# Patient Record
Sex: FEMALE | Race: WHITE | NOT HISPANIC OR LATINO | Employment: OTHER | URBAN - METROPOLITAN AREA
[De-identification: names, ages, dates, MRNs, and addresses within clinical notes are randomized per-mention and may not be internally consistent; named-entity substitution may affect disease eponyms.]

---

## 2017-10-24 ENCOUNTER — TRANSCRIBE ORDERS (OUTPATIENT)
Dept: ADMINISTRATIVE | Facility: HOSPITAL | Age: 72
End: 2017-10-24

## 2017-10-24 DIAGNOSIS — Z12.31 VISIT FOR SCREENING MAMMOGRAM: Primary | ICD-10-CM

## 2017-10-30 ENCOUNTER — ALLSCRIPTS OFFICE VISIT (OUTPATIENT)
Dept: OTHER | Facility: OTHER | Age: 72
End: 2017-10-30

## 2017-10-30 DIAGNOSIS — Z12.31 ENCOUNTER FOR SCREENING MAMMOGRAM FOR MALIGNANT NEOPLASM OF BREAST: ICD-10-CM

## 2017-10-30 DIAGNOSIS — N30.20 OTHER CHRONIC CYSTITIS WITHOUT HEMATURIA: ICD-10-CM

## 2017-10-30 LAB
BILIRUB UR QL STRIP: NORMAL
CLARITY UR: NORMAL
COLOR UR: YELLOW
GLUCOSE (HISTORICAL): NORMAL
HGB UR QL STRIP.AUTO: NORMAL
KETONES UR STRIP-MCNC: NORMAL MG/DL
LEUKOCYTE ESTERASE UR QL STRIP: NORMAL
NITRITE UR QL STRIP: NORMAL
PH UR STRIP.AUTO: 6.5 [PH]
PROT UR STRIP-MCNC: NORMAL MG/DL
SP GR UR STRIP.AUTO: 1.01
UROBILINOGEN UR QL STRIP.AUTO: 0.2

## 2017-11-21 ENCOUNTER — ALLSCRIPTS OFFICE VISIT (OUTPATIENT)
Dept: OTHER | Facility: OTHER | Age: 72
End: 2017-11-21

## 2017-12-15 ENCOUNTER — HOSPITAL ENCOUNTER (OUTPATIENT)
Dept: MAMMOGRAPHY | Facility: MEDICAL CENTER | Age: 72
Discharge: HOME | End: 2017-12-15
Payer: MEDICARE

## 2017-12-15 DIAGNOSIS — Z12.31 ENCOUNTER FOR SCREENING MAMMOGRAM FOR MALIGNANT NEOPLASM OF BREAST: ICD-10-CM

## 2017-12-15 PROCEDURE — G0202 SCR MAMMO BI INCL CAD: HCPCS

## 2018-01-16 NOTE — CONSULTS
Chief Complaint  The patient presents to the office for her routine exam       History of Present Illness  HPI: here for yearly preventive exam; no health issues at [facility]   GYN , Adult Female [place]: The patient is being seen for a health maintenance evaluation  The last health maintenance visit was 13 month(s) ago  General Health: The patient's health since the last visit is described as good  She has regular dental visits  She denies vision problems  She denies hearing loss  Immunizations status: up to date  Lifestyle:  She consumes a diverse and healthy diet  She does not have any weight concerns  She exercises regularly  She does not use tobacco  She denies alcohol use  She denies drug use  Reproductive health:  she reports no menstrual problems  Screening: cancer screening reviewed and current  metabolic screening reviewed and current  risk screening reviewed and current  Review of Systems    Constitutional: No fever, no chills, feels well, no tiredness, no recent weight gain or loss  ENT: no ear ache, no loss of hearing, no nosebleeds or nasal discharge, no sore throat or hoarseness  Cardiovascular: no complaints of slow or fast heart rate, no chest pain, no palpitations, no leg claudication or lower extremity edema  Respiratory: no complaints of shortness of breath, no wheezing, no dyspnea on exertion, no orthopnea or PND  Breasts: no complaints of breast pain, breast lump or nipple discharge  Gastrointestinal: no complaints of abdominal pain, no constipation, no nausea or diarrhea, no vomiting, no bloody stools  Genitourinary: no complaints of dysuria, no incontinence, no pelvic pain, no dysmenorrhea, no vaginal discharge or abnormal vaginal bleeding  Musculoskeletal: no complaints of arthralgia, no myalgia, no joint swelling or stiffness, no limb pain or swelling  Integumentary: no complaints of skin rash or lesion, no itching or dry skin, no skin wounds  Neurological: no complaints of headache, no confusion, no numbness or tingling, no dizziness or fainting  Active Problems    1  Chronic cystitis (595 2) (N30 20)   2  Encounter for routine gynecological examination (V72 31) (Z01 419)   3  Encounter for screening mammogram for malignant neoplasm of breast (V76 12)   (Z12 31)   4  Mammogram abnormal (793 80) (R92 8)   5  Postmenopausal (V49 81) (Z78 0)    Past Medical History    · History of hypertension (V12 59) (Z86 79)   · History of Urinary Tract Infection (V13 02)    Surgical History    · History of Colonoscopy (Fiberoptic) Screening   · History of Hysterectomy   · History of Knee Arthroscopy (Therapeutic)   · History of Salpingo-oophorectomy Right Side    Family History    · Family history of diabetes mellitus (V18 0) (Z83 3)    · Family history of diabetes mellitus (V18 0) (Z83 3)    Social History    · Being A Social Drinker   · Never A Smoker    Current Meds   1  AmLODIPine Besylate 5 MG Oral Tablet; Therapy: (Recorded:65Olj9252) to Recorded   2  Aspirin 81 MG TABS; Take 1 tablet daily Recorded   3  Clarinex TABS Recorded   4  Lexapro 10 MG Oral Tablet; Therapy: (Recorded:20Ctv8839) to Recorded   5  Multivitamins Oral Capsule Recorded   6  Oscal 500/200 D-3 TABS; Therapy: (0370 [id number]) to Recorded   7  Trimethoprim 100 MG Oral Tablet; take 1 tablet every twelve hours; Therapy: [id number] to (Christie Peoples)  Requested for: 64DTM6871; Last   Rx:30Oct2017 Ordered   8  Zocor 20 MG Oral Tablet Recorded    Allergies    1  Benzocaine Oral Anesthetic SOLN   2  Cipro TABS   3  Codeine Sulfate TABS   4  Ibuprofen TABS   5  Latex Gloves MISC   6  Macrobid CAPS   7  Sulfa Drugs    Vitals   Recorded: 61IUV9981 06:25GT   Systolic 068   Diastolic 60   Height 5 ft 5 in   Weight 164 lb 9 oz   BMI Calculated 27 38   BSA Calculated 1 82     Physical Exam    Constitutional   General appearance: No acute distress, well appearing and well nourished  Neck   Neck: Normal, supple, trachea midline, no masses  Thyroid: Normal, no thyromegaly  Pulmonary   Respiratory effort: No increased work of breathing or signs of respiratory distress  Auscultation of lungs: Clear to auscultation  Cardiovascular   Auscultation of heart: Normal rate and rhythm, normal S1 and S2, no murmurs  Peripheral vascular exam: Normal pulses Throughout  Genitourinary   External genitalia: Normal and no lesions appreciated  Vagina: Normal, no lesions or dryness appreciated  Urethra: Normal     Urethral meatus: Normal     Bladder: Normal, soft, non-tender and no prolapse or masses appreciated  Cervix: Surgically absent  Uterus: Surgically absent  Adnexa/parametria: Surgically absent  Anus, perineum, and rectum: Normal sphincter tone, no masses, and no prolapse  Chest   Breasts: Normal and no dimpling or skin changes noted  Abdomen   Abdomen: Normal, non-tender, and no organomegaly noted  Liver and spleen: No hepatomegaly or splenomegaly  Examination for hernias: No hernias appreciated  Stool sample for occult blood: Negative  Lymphatic   Palpation of lymph nodes in neck, axillae, groin and/or other locations: No lymphadenopathy or masses noted  Skin   Skin and subcutaneous tissue: Normal skin turgor and no rashes  Palpation of skin and subcutaneous tissue: Normal     Psychiatric   Orientation to person, place, and time: Normal     Mood and affect: Normal        Assessment    1  Encounter for routine gynecological examination (V72 31) (Z01 419)   2  Postmenopausal (V49 81) (Z78 0)    Plan  Encounter for screening mammogram for malignant neoplasm of breast    · * MAMMO SCREENING BILATERAL W CAD; Status:Hold For - Scheduling,Retrospective  By Protocol Authorization; Requested KMU:[id number];    Perform:[facility]; NDU:09DXO1187;  Last Updated By:Randy Urias; 11/21/2017 11:14:17 AM;Ordered;  For:Encounter for screening mammogram for malignant neoplasm of breast; Ordered By:Nicko Garcia;  Postmenopausal    · Follow-up visit in 1 year Evaluation and Treatment  Follow-up  Status: Hold For -  Scheduling  Requested for: [id number]   Ordered; For: Postmenopausal; Ordered By: Ulices Molina Performed:  Due: 74HNH0644    Discussion/Summary    Reviewed potential gyn emergencies; Rx given for screening mammogram; seen with p marek  student, Nicolás Kirkland        Future Appointments    Signatures   Electronically signed by : Scott Beltran DO; Nov 21 2017 11:55AM EST                       (Author)

## 2018-01-22 VITALS
WEIGHT: 164.56 LBS | HEIGHT: 65 IN | BODY MASS INDEX: 27.42 KG/M2 | DIASTOLIC BLOOD PRESSURE: 60 MMHG | SYSTOLIC BLOOD PRESSURE: 120 MMHG

## 2018-01-22 VITALS
BODY MASS INDEX: 26.82 KG/M2 | DIASTOLIC BLOOD PRESSURE: 72 MMHG | SYSTOLIC BLOOD PRESSURE: 118 MMHG | HEIGHT: 65 IN | WEIGHT: 161 LBS

## 2018-05-24 ENCOUNTER — TRANSCRIBE ORDERS (OUTPATIENT)
Dept: ADMINISTRATIVE | Facility: HOSPITAL | Age: 73
End: 2018-05-24

## 2018-05-24 DIAGNOSIS — Z12.31 VISIT FOR SCREENING MAMMOGRAM: Primary | ICD-10-CM

## 2018-11-01 RX ORDER — OYSTER SHELL CALCIUM WITH VITAMIN D 500; 200 MG/1; [IU]/1
TABLET, FILM COATED ORAL
COMMUNITY

## 2018-11-01 RX ORDER — FLUTICASONE PROPIONATE 50 MCG
2 SPRAY, SUSPENSION (ML) NASAL DAILY
Refills: 0 | COMMUNITY
Start: 2018-08-01

## 2018-11-01 RX ORDER — ESCITALOPRAM OXALATE 10 MG/1
TABLET ORAL
Refills: 0 | COMMUNITY
Start: 2018-08-08

## 2018-11-01 RX ORDER — SIMVASTATIN 20 MG
20 TABLET ORAL EVERY EVENING
Refills: 0 | COMMUNITY
Start: 2018-08-30

## 2018-11-01 RX ORDER — AMLODIPINE BESYLATE 2.5 MG/1
TABLET ORAL
Refills: 0 | COMMUNITY
Start: 2018-09-10

## 2018-11-01 RX ORDER — DESLORATADINE 5 MG/1
TABLET ORAL
Refills: 0 | COMMUNITY
Start: 2018-08-20

## 2018-11-02 ENCOUNTER — OFFICE VISIT (OUTPATIENT)
Dept: UROLOGY | Facility: MEDICAL CENTER | Age: 73
End: 2018-11-02
Payer: MEDICARE

## 2018-11-02 VITALS
SYSTOLIC BLOOD PRESSURE: 120 MMHG | BODY MASS INDEX: 26.2 KG/M2 | DIASTOLIC BLOOD PRESSURE: 70 MMHG | WEIGHT: 163 LBS | HEIGHT: 66 IN

## 2018-11-02 DIAGNOSIS — R31.29 MICROSCOPIC HEMATURIA: ICD-10-CM

## 2018-11-02 DIAGNOSIS — N30.20 CHRONIC CYSTITIS: ICD-10-CM

## 2018-11-02 DIAGNOSIS — N39.0 CHRONIC UTI: Primary | ICD-10-CM

## 2018-11-02 PROCEDURE — 99213 OFFICE O/P EST LOW 20 MIN: CPT | Performed by: UROLOGY

## 2018-11-02 RX ORDER — TRIMETHOPRIM 100 MG/1
100 TABLET ORAL 2 TIMES DAILY
Qty: 20 TABLET | Refills: 0 | Status: SHIPPED | OUTPATIENT
Start: 2018-11-02 | End: 2018-11-12

## 2018-11-02 RX ORDER — MULTIVITAMIN
1 TABLET ORAL DAILY
COMMUNITY

## 2018-11-02 NOTE — LETTER
2018     Caty Castro, DO  [address]    Patient: Tiffani Schmitz   YOB: 1945   Date of Visit: 2018       Dear Dr Douglas Damon: Thank you for referring Nichole Steinberg to me for evaluation  Below are my notes for this consultation  If you have questions, please do not hesitate to call me  I look forward to following your patient along with you  Sincerely,        Benja Hawley MD        CC: No Recipients  Benja Hawley MD  2018  3:31 PM  Sign at close encounter  [address] [facility]  [address]  [phone]  www  [facility]  org      NAME: Tiffani Schmitz  AGE: 74 y o  SEX: female  : 1945   MRN: [id number]    DATE: 2018  TIME: 12:55 PM    Assessment and Plan:  Chronic cystitis:  Doing very well with cranberry and hydration and as needed trimethoprim  Trimethoprim refilled  Will ask Dr Douglas Damon to refill her trimethoprim the future  Follow up with me p r n  Lajean Cassette Chief Complaint   No chief complaint on file  History of Present Illness     Established patient here for follow-up of chronic cystitis and chronic microscopic hematuria status post negative workup in  and   Last office visit 1 year ago she was given trimethoprim to take p r n  Lajean Cassette She has not been having very much in the way of recurrent infections  She has only used the trimethoprim once this year and that was last month  Her symptoms at that time or burning and frequency  She has urinary frequency but she does drink a lot a water      The following portions of the patient's history were reviewed and updated as appropriate: allergies, current medications, past family history, past medical history, past social history, past surgical history and problem list     Review of Systems   Review of Systems    Active Problem List   There is no problem list on file for this patient  Objective   There were no vitals taken for this visit  Physical Exam   Constitutional: She is oriented to person, place, and time  She appears well-developed and well-nourished  HENT:   Head: Normocephalic and atraumatic  Eyes: EOM are normal    Neck: Normal range of motion  Pulmonary/Chest: Effort normal    Musculoskeletal: Normal range of motion  Neurological: She is alert and oriented to person, place, and time  Skin: Skin is warm and dry  Psychiatric: She has a normal mood and affect   Her behavior is normal  Judgment and thought content normal            Current Medications     Current Outpatient Prescriptions:     amLODIPine (NORVASC) 2 5 mg tablet, , Disp: , Rfl: 0    aspirin 81 MG tablet, Take 1 tablet by mouth daily, Disp: , Rfl:     calcium-vitamin D (OSCAL 500/200 D-3) 500 mg-200 units per tablet, Take by mouth, Disp: , Rfl:     desloratadine (CLARINEX) 5 MG tablet, , Disp: , Rfl: 0    escitalopram (LEXAPRO) 10 mg tablet, , Disp: , Rfl: 0    fluticasone (FLONASE) 50 mcg/act nasal spray, 2 sprays daily, Disp: , Rfl: 0    Pediatric Multivitamins-Fl (MULTIVITAMINS/FL PO), Take by mouth, Disp: , Rfl:     simvastatin (ZOCOR) 20 mg tablet, Take 20 mg by mouth every evening, Disp: , Rfl: 0        Carla Bautista MD

## 2018-11-02 NOTE — PROGRESS NOTES
[address] [facility]  [facility]  [address] [facility]  [place], [address] [place] [address]  [phone]  www  [facility]  org      NAME: Marcos Yadav  AGE: 74 y o  SEX: female  : 1945   MRN: [id number]    DATE: 2018  TIME: 12:55 PM    Assessment and Plan:  Chronic cystitis:  Doing very well with cranberry and hydration and as needed trimethoprim  Trimethoprim refilled  Will ask Dr Ghazala Guajardo to refill her trimethoprim the future  Follow up with me p r n  Eladio Po Chief Complaint   No chief complaint on file  History of Present Illness     Established patient here for follow-up of chronic cystitis and chronic microscopic hematuria status post negative workup in  and   Last office visit 1 year ago she was given trimethoprim to take p r n  Eladio Po She has not been having very much in the way of recurrent infections  She has only used the trimethoprim once this year and that was last month  Her symptoms at that time or burning and frequency  She has urinary frequency but she does drink a lot a water  The following portions of the patient's history were reviewed and updated as appropriate: allergies, current medications, past family history, past medical history, past social history, past surgical history and problem list     Review of Systems   Review of Systems    Active Problem List   There is no problem list on file for this patient  Objective   There were no vitals taken for this visit  Physical Exam   Constitutional: She is oriented to person, place, and time  She appears well-developed and well-nourished  HENT:   Head: Normocephalic and atraumatic  Eyes: EOM are normal    Neck: Normal range of motion  Pulmonary/Chest: Effort normal    Musculoskeletal: Normal range of motion  Neurological: She is alert and oriented to person, place, and time  Skin: Skin is warm and dry  Psychiatric: She has a normal mood and affect   Her behavior is normal  Judgment and thought content normal            Current Medications     Current Outpatient Prescriptions:     amLODIPine (NORVASC) 2 5 mg tablet, , Disp: , Rfl: 0    aspirin 81 MG tablet, Take 1 tablet by mouth daily, Disp: , Rfl:     calcium-vitamin D (OSCAL 500/200 D-3) 500 mg-200 units per tablet, Take by mouth, Disp: , Rfl:     desloratadine (CLARINEX) 5 MG tablet, , Disp: , Rfl: 0    escitalopram (LEXAPRO) 10 mg tablet, , Disp: , Rfl: 0    fluticasone (FLONASE) 50 mcg/act nasal spray, 2 sprays daily, Disp: , Rfl: 0    Pediatric Multivitamins-Fl (MULTIVITAMINS/FL PO), Take by mouth, Disp: , Rfl:     simvastatin (ZOCOR) 20 mg tablet, Take 20 mg by mouth every evening, Disp: , Rfl: 0        Marie Woo MD

## 2018-11-27 ENCOUNTER — ANNUAL EXAM (OUTPATIENT)
Dept: OBGYN CLINIC | Facility: MEDICAL CENTER | Age: 73
End: 2018-11-27
Payer: MEDICARE

## 2018-11-27 VITALS — DIASTOLIC BLOOD PRESSURE: 64 MMHG | WEIGHT: 164 LBS | SYSTOLIC BLOOD PRESSURE: 118 MMHG | BODY MASS INDEX: 26.88 KG/M2

## 2018-11-27 DIAGNOSIS — Z12.31 ENCOUNTER FOR SCREENING MAMMOGRAM FOR MALIGNANT NEOPLASM OF BREAST: ICD-10-CM

## 2018-11-27 DIAGNOSIS — Z78.0 POSTMENOPAUSAL: ICD-10-CM

## 2018-11-27 DIAGNOSIS — Z01.419 ENCNTR FOR GYN EXAM (GENERAL) (ROUTINE) W/O ABN FINDINGS: Primary | ICD-10-CM

## 2018-11-27 PROBLEM — M54.50 LOW BACK PAIN: Status: ACTIVE | Noted: 2018-11-27

## 2018-11-27 PROBLEM — M85.80 OSTEOPENIA: Status: ACTIVE | Noted: 2018-11-19

## 2018-11-27 PROBLEM — IMO0002 DEGENERATION OF INTERVERTEBRAL DISC: Status: ACTIVE | Noted: 2018-11-27

## 2018-11-27 PROBLEM — M43.10 SPONDYLOLISTHESIS, ACQUIRED: Status: ACTIVE | Noted: 2018-11-27

## 2018-11-27 PROBLEM — M54.16 LUMBAR RADICULOPATHY: Status: ACTIVE | Noted: 2018-11-27

## 2018-11-27 PROBLEM — M51.379 DEGENERATION OF LUMBOSACRAL INTERVERTEBRAL DISC: Status: ACTIVE | Noted: 2018-11-27

## 2018-11-27 PROBLEM — M51.37 DEGENERATION OF LUMBOSACRAL INTERVERTEBRAL DISC: Status: ACTIVE | Noted: 2018-11-27

## 2018-11-27 PROBLEM — M51.369 DEGENERATION OF LUMBAR INTERVERTEBRAL DISC: Status: ACTIVE | Noted: 2018-11-27

## 2018-11-27 PROBLEM — M51.36 DEGENERATION OF LUMBAR INTERVERTEBRAL DISC: Status: ACTIVE | Noted: 2018-11-27

## 2018-11-27 PROBLEM — M54.14 THORACIC NEURITIS: Status: ACTIVE | Noted: 2018-11-27

## 2018-11-27 PROCEDURE — G0101 CA SCREEN;PELVIC/BREAST EXAM: HCPCS | Performed by: OBSTETRICS & GYNECOLOGY

## 2018-11-27 NOTE — PROGRESS NOTES
ASSESSMENT & PLAN: Ginna Mcintosh was seen today for gynecologic exam     Diagnoses and all orders for this visit:    Encntr for gyn exam (general) (routine) w/o abn findings    Encounter for screening mammogram for malignant neoplasm of breast  -     Mammo screening bilateral w cad; Future    Postmenopausal  -     DXA bone density spine hip and pelvis; Future    Discussion/Summary:  Pt  Here for yearly gyn preventive exam; mentioned, on occasion, slight blood on toilet tissue; p e  Neg  For occult blood in vagina or in any genital area; pt  Admits to frequent UTIs (sees a urologist);npt  Seen with shawn Miller student, Nicolás  1   Routine well woman exam done today  2   Pap and HPV:  Pap and cotesting was not done today  Current ASCCP Guidelines reviewed  3   Mammogram was ordered  4   Colonoscopy is up to date  5   DEXA is not up to date  DEXA was ordered today  6  The following were reviewed in today's visit: breast self exam   7   F/u 2years  CC:  Annual Gynecologic Examination    HPI: Jasiel Durbin is a 74 y o  who presents for annual gynecologic examination  She has the following concerns:  See above    Health Maintenance:     Patient describes her health as good  Patient does not have weight concerns  She exercises 7 days per week with walking  She does wears her seatbelt routinely  She does perform regular monthly self breast exams  She does feel safe at home  Patients does not follow a  diet  Patient gets 4 servings of dairy or calcium rich foods daily        Last pap: had hysterectomy  Last mammogram: 2017  Last colonoscopy: 2016    Patient Active Problem List   Diagnosis    Spondylolisthesis, acquired    Allergic rhinitis due to pollen    Degeneration of intervertebral disc    Degeneration of lumbar intervertebral disc    Degeneration of lumbosacral intervertebral disc    Generalized anxiety disorder    Hyperlipidemia    Hypertension, benign    Left sided sciatica    Low back pain    Lumbar radiculopathy    Lumbosacral spondylosis    Mammogram abnormal    Osteopenia    Routine history and physical examination of adult    Spinal stenosis of lumbar region    Thoracic neuritis    Vitamin D deficiency       Past Medical History:   Diagnosis Date    Anxiety     Back pain     Chronic cystitis     Dysuria     Frequent urination     Heartburn     Hematuria     Hypertension     Seasonal allergies     Sinusitis     UTI (urinary tract infection)        Past Surgical History:   Procedure Laterality Date    APPENDECTOMY      BUNIONECTOMY      KNEE SURGERY      ROTATOR CUFF REPAIR         Past OB/Gyn History:    Patient is currently sexually active  Family History   Problem Relation Age of Onset    Kidney failure Mother     Diabetes Family        Social History:   Social History     Social History    Marital status: /Civil Union     Spouse name: N/A    Number of children: N/A    Years of education: N/A     Occupational History    Not on file  Social History Main Topics    Smoking status: Never Smoker    Smokeless tobacco: Never Used    Alcohol use No    Drug use: No    Sexual activity: No     Other Topics Concern    Not on file     Social History Narrative    No narrative on file     Presently lives with   Patient is currently retired       Allergies   Allergen Reactions    Benzocaine Other (See Comments)    Ciprofloxacin Other (See Comments)    Codeine Other (See Comments)    Ibuprofen Other (See Comments)     takes ASA at home  Reaction Date: 17Jun2011;     Mangifera Indica      Other reaction(s): numbness of lips    Nitrofurantoin Other (See Comments)     Other reaction(s): Flu like sysptoms  Reaction Date: 17Jun2011;     Latex Other (See Comments) and Rash     Reaction Date: 17Jun2011;     Sulfa Antibiotics Other (See Comments)    Sulfacetamide Rash         Current Outpatient Prescriptions:     amLODIPine (NORVASC) 2 5 mg tablet, , Disp: , Rfl: 0    aspirin 81 MG tablet, Take 1 tablet by mouth daily, Disp: , Rfl:     BLACK ELDERBERRY,BERRY-FLOWER, PO, Take by mouth, Disp: , Rfl:     calcium-vitamin D (OSCAL 500/200 D-3) 500 mg-200 units per tablet, Take by mouth, Disp: , Rfl:     CRANBERRY FRUIT PO, Take by mouth, Disp: , Rfl:     desloratadine (CLARINEX) 5 MG tablet, , Disp: , Rfl: 0    escitalopram (LEXAPRO) 10 mg tablet, , Disp: , Rfl: 0    fluticasone (FLONASE) 50 mcg/act nasal spray, 2 sprays daily, Disp: , Rfl: 0    Multiple Vitamin (MULTIVITAMIN) tablet, Take 1 tablet by mouth daily, Disp: , Rfl:     Pediatric Multivitamins-Fl (MULTIVITAMINS/FL PO), Take by mouth, Disp: , Rfl:     simvastatin (ZOCOR) 20 mg tablet, Take 20 mg by mouth every evening, Disp: , Rfl: 0    Review of Systems  Constitutional :no fever, feels well, no tiredness, no recent weight gain or loss  ENT: no ear ache, no loss of hearing, no nosebleeds or nasal discharge, no sore throat or hoarseness  Cardiovascular: no complaints of slow or fast heart beat, no chest pain, no palpitations, no leg claudication or lower extremity edema  Respiratory: no complaints of shortness of shortness of breath, no BRICENO  Breasts:no complaints of breast pain, breast lump, or nipple discharge  Gastrointestinal: no complaints of abdominal pain, constipation, nausea, vomiting, or diarrhea or bloody stools  Genitourinary : no complaints of dysuria, incontinence, pelvic pain, no dysmenorrhea, vaginal discharge or abnormal vaginal bleeding and as noted in HPI  Musculoskeletal: no complaints of arthralgia, no myalgia, no joint swelling or stiffness, no limb pain or swelling    Integumentary: no complaints of skin rash or lesion, itching or dry skin  Neurological: no complaints of headache, no confusion, no numbness or tingling, no dizziness or fainting     Physical Exam:   /64   Wt 74 4 kg (164 lb)   BMI 26 88 kg/m²     General: appears stated age, cooperative, alert normal mood and affect   Psychiatric oriented to person, place and time  Mood and affect normal   Neck: normal, supple,trachea midline, no masses  Thyroid: normal, no thyromegaly   Heart: regular rate and rhythm, S1, S2 normal, no murmur, click, rub or gallop   Lungs: clear to auscultation bilaterally, no increased work of breathing or signs of respiratory distress   Breasts: normal, no dimpling or skin changes noted   Abdomen: soft, non-tender, without masses or organomegaly   Vulva: normal , no lesions   Vagina: normal , no lesions or dryness   Urethra: normal   Urethal meatus normal   Bladder Normal, soft, non-tender and no prolapse or masses appreciated   Cervix: Surgically absent   Uterus: Surgically absent   Adnexa: Surgically absent; hemoccult neg  Lymphatic Palpation of lymph nodes in neck, axilla, groin and/or other locations: no lymphadenopathy or masses noted   Skin Normal skin turgor and no rashes    Palpation of skin and subcutaneous tissue normal

## 2018-12-17 ENCOUNTER — HOSPITAL ENCOUNTER (OUTPATIENT)
Dept: MAMMOGRAPHY | Facility: MEDICAL CENTER | Age: 73
Discharge: HOME | End: 2018-12-17
Payer: MEDICARE

## 2018-12-17 ENCOUNTER — HOSPITAL ENCOUNTER (OUTPATIENT)
Dept: BONE DENSITY | Facility: MEDICAL CENTER | Age: 73
End: 2018-12-17
Payer: MEDICARE

## 2018-12-17 VITALS — HEIGHT: 66 IN | WEIGHT: 164 LBS | BODY MASS INDEX: 26.36 KG/M2

## 2018-12-17 DIAGNOSIS — Z12.31 ENCOUNTER FOR SCREENING MAMMOGRAM FOR MALIGNANT NEOPLASM OF BREAST: ICD-10-CM

## 2018-12-17 PROCEDURE — 77067 SCR MAMMO BI INCL CAD: CPT

## 2018-12-21 ENCOUNTER — HOSPITAL ENCOUNTER (OUTPATIENT)
Dept: BONE DENSITY | Facility: MEDICAL CENTER | Age: 73
Discharge: HOME | End: 2018-12-21
Payer: MEDICARE

## 2018-12-21 DIAGNOSIS — Z78.0 POSTMENOPAUSAL: ICD-10-CM

## 2018-12-21 PROCEDURE — 77080 DXA BONE DENSITY AXIAL: CPT

## 2019-10-18 ENCOUNTER — TRANSCRIBE ORDERS (OUTPATIENT)
Dept: ADMINISTRATIVE | Facility: HOSPITAL | Age: 74
End: 2019-10-18

## 2019-10-18 DIAGNOSIS — Z12.31 SCREENING MAMMOGRAM FOR HIGH-RISK PATIENT: Primary | ICD-10-CM

## 2019-12-03 ENCOUNTER — ANNUAL EXAM (OUTPATIENT)
Dept: OBGYN CLINIC | Facility: MEDICAL CENTER | Age: 74
End: 2019-12-03
Payer: MEDICARE

## 2019-12-03 VITALS
BODY MASS INDEX: 26.82 KG/M2 | HEIGHT: 65 IN | DIASTOLIC BLOOD PRESSURE: 76 MMHG | WEIGHT: 161 LBS | SYSTOLIC BLOOD PRESSURE: 122 MMHG

## 2019-12-03 DIAGNOSIS — Z01.419 ENCNTR FOR GYN EXAM (GENERAL) (ROUTINE) W/O ABN FINDINGS: Primary | ICD-10-CM

## 2019-12-03 DIAGNOSIS — Z12.31 ENCOUNTER FOR SCREENING MAMMOGRAM FOR MALIGNANT NEOPLASM OF BREAST: ICD-10-CM

## 2019-12-03 PROCEDURE — G0101 CA SCREEN;PELVIC/BREAST EXAM: HCPCS | Performed by: OBSTETRICS & GYNECOLOGY

## 2019-12-03 NOTE — PROGRESS NOTES
ASSESSMENT & PLAN: Jared López was seen today for gynecologic exam     Diagnoses and all orders for this visit:    Encntr for gyn exam (general) (routine) w/o abn findings    Encounter for screening mammogram for malignant neoplasm of breast  -     Mammo screening bilateral w 3d & cad; Future    Discussion/Summary:  Patient here for yearly gyn preventive exam; no new issues;  to have surgery for adrenal cancer  Pt  Seen with shawn Montes  [facility]  1  Routine well woman exam done today  2   Pap and HPV:  Pap and cotesting was done today  Current ASCCP Guidelines reviewed  3   Mammogram was ordered  4   Colorectal cancer screening is up to date  5   DEXA is up to date  DEXA was not ordered today  6  The following were reviewed in today's visit: breast self exam, adequate intake of calcium and vitamin D, exercise and healthy diet  7   F/u 2 years  CC:  Annual Gynecologic Examination    HPI: Justo Becker is a 66 y o  who presents for annual gynecologic examination  She has the following concerns:  Occasional vaginal spotting    Health Maintenance:    Patient describes her health as good  Patient does not have weight concerns  She exercises 7 days per week with walking  She does wear her seatbelt routinely  She does perform regular monthly self breast exams  She does feel safe at home  Patients does not follow a  diet  Patient gets 5 servings of dairy or calcium rich foods daily        Last pap:   Last mammogram:   Last colorectal cancer screenin  Last DEXA:     Patient Active Problem List   Diagnosis    Spondylolisthesis, acquired    Allergic rhinitis due to pollen    Degeneration of intervertebral disc    Degeneration of lumbar intervertebral disc    Degeneration of lumbosacral intervertebral disc    Generalized anxiety disorder    Hyperlipidemia    Hypertension, benign    Left sided sciatica    Low back pain    Lumbar radiculopathy    Lumbosacral spondylosis    Mammogram abnormal    Osteopenia    Routine history and physical examination of adult    Spinal stenosis of lumbar region    Thoracic neuritis    Vitamin D deficiency       Past Medical History:   Diagnosis Date    Anxiety     Back pain     Chronic cystitis     Dysuria     Frequent urination     Heartburn     Hematuria     Hypertension     Seasonal allergies     Sinusitis     UTI (urinary tract infection)        Past Surgical History:   Procedure Laterality Date    APPENDECTOMY      BUNIONECTOMY      HYSTERECTOMY      at age 74    KNEE SURGERY      OOPHORECTOMY      age 74   [de-identified] CUFF REPAIR         Past OB/Gyn History:    Patient is currently sexually active     monogamous      Family History   Problem Relation Age of Onset    Kidney failure Mother     Diabetes Family        Social History:   Social History     Socioeconomic History    Marital status: /Civil Union     Spouse name: Not on file    Number of children: Not on file    Years of education: Not on file    Highest education level: Not on file   Occupational History    Not on file   Social Needs    Financial resource strain: Not on file    Food insecurity:     Worry: Not on file     Inability: Not on file    Transportation needs:     Medical: Not on file     Non-medical: Not on file   Tobacco Use    Smoking status: Never Smoker    Smokeless tobacco: Never Used   Substance and Sexual Activity    Alcohol use: No    Drug use: No    Sexual activity: Never   Lifestyle    Physical activity:     Days per week: Not on file     Minutes per session: Not on file    Stress: Not on file   Relationships    Social connections:     Talks on phone: Not on file     Gets together: Not on file     Attends Latter day service: Not on file     Active member of club or organization: Not on file     Attends meetings of clubs or organizations: Not on file     Relationship status: Not on file    Intimate partner violence:     Fear of current or ex partner: Not on file     Emotionally abused: Not on file     Physically abused: Not on file     Forced sexual activity: Not on file   Other Topics Concern    Not on file   Social History Narrative    Not on file     Presently lives with   Patient is currently employed   Allergies   Allergen Reactions    Benzocaine Other (See Comments)    Ciprofloxacin Other (See Comments)    Codeine Other (See Comments)    Ibuprofen Other (See Comments)     takes ASA at home  Reaction Date: 17Jun2011;     Mangifera Indica      Other reaction(s): numbness of lips  (NITO)    Nitrofurantoin Other (See Comments)     Other reaction(s): Flu like sysptoms  Reaction Date: 17Jun2011;     Latex Other (See Comments) and Rash     Reaction Date: 17Jun2011;     Sulfa Antibiotics Other (See Comments)    Sulfacetamide Rash    Sulfasalazine Other (See Comments)         Current Outpatient Medications:     amLODIPine (NORVASC) 2 5 mg tablet, , Disp: , Rfl: 0    aspirin 81 MG tablet, Take 1 tablet by mouth daily, Disp: , Rfl:     BLACK ELDERBERRY,BERRY-FLOWER, PO, Take by mouth, Disp: , Rfl:     calcium-vitamin D (OSCAL 500/200 D-3) 500 mg-200 units per tablet, Take by mouth, Disp: , Rfl:     CRANBERRY FRUIT PO, Take by mouth, Disp: , Rfl:     desloratadine (CLARINEX) 5 MG tablet, , Disp: , Rfl: 0    escitalopram (LEXAPRO) 10 mg tablet, , Disp: , Rfl: 0    fluticasone (FLONASE) 50 mcg/act nasal spray, 2 sprays daily, Disp: , Rfl: 0    Multiple Vitamin (MULTIVITAMIN) tablet, Take 1 tablet by mouth daily, Disp: , Rfl:     simvastatin (ZOCOR) 20 mg tablet, Take 20 mg by mouth every evening, Disp: , Rfl: 0    Review of Systems  Constitutional :no fever, feels well, no tiredness, no recent weight gain or loss  ENT: no ear ache, no loss of hearing, no nosebleeds or nasal discharge, no sore throat or hoarseness    Cardiovascular: no complaints of slow or fast heart beat, no chest pain, no palpitations, no leg claudication or lower extremity edema  Respiratory: no complaints of shortness of shortness of breath, no BRICENO  Breasts:no complaints of breast pain, breast lump, or nipple discharge  Gastrointestinal: no complaints of abdominal pain, constipation, nausea, vomiting, or diarrhea or bloody stools  Genitourinary : no complaints of dysuria, incontinence, pelvic pain, no dysmenorrhea, vaginal discharge or abnormal vaginal bleeding and as noted in HPI  Musculoskeletal: no complaints of arthralgia, no myalgia, no joint swelling or stiffness, no limb pain or swelling  Integumentary: no complaints of skin rash or lesion, itching or dry skin  Neurological: no complaints of headache, no confusion, no numbness or tingling, no dizziness or fainting     Physical Exam:   /76   Ht 5' 5" (1 651 m)   Wt 73 kg (161 lb)   LMP  (LMP Unknown)   Breastfeeding? No   BMI 26 79 kg/m²     General: appears stated age, cooperative, alert normal mood and affect   Psychiatric oriented to person, place and time  Mood and affect normal   Neck: normal, supple,trachea midline, no masses  Thyroid: normal, no thyromegaly   Heart: regular rate and rhythm, S1, S2 normal, no murmur, click, rub or gallop   Lungs: clear to auscultation bilaterally, no increased work of breathing or signs of respiratory distress   Breasts: normal, no dimpling or skin changes noted   Abdomen: soft, non-tender, without masses or organomegaly   Vulva: normal , no lesions   Vagina: positive 1 cm spot on vaginal cuff was pink and source of vaginal spotting; cauterized with silver nitrate   Urethra: normal   Urethal meatus normal   Bladder Normal, soft, non-tender and no prolapse or masses appreciated   Cervix: Surgically absent   Uterus: Surgically absent   Adnexa: Surgically absent; hemoccult neg     Lymphatic Palpation of lymph nodes in neck, axilla, groin and/or other locations: no lymphadenopathy or masses noted   Skin Normal skin turgor and no rashes    Palpation of skin and subcutaneous tissue normal

## 2019-12-18 ENCOUNTER — HOSPITAL ENCOUNTER (OUTPATIENT)
Dept: MAMMOGRAPHY | Facility: MEDICAL CENTER | Age: 74
Discharge: HOME | End: 2019-12-18
Payer: MEDICARE

## 2019-12-18 VITALS — BODY MASS INDEX: 26.82 KG/M2 | WEIGHT: 161 LBS | HEIGHT: 65 IN

## 2019-12-18 DIAGNOSIS — Z12.31 ENCOUNTER FOR SCREENING MAMMOGRAM FOR MALIGNANT NEOPLASM OF BREAST: ICD-10-CM

## 2019-12-18 PROCEDURE — 77067 SCR MAMMO BI INCL CAD: CPT

## 2019-12-18 PROCEDURE — 77063 BREAST TOMOSYNTHESIS BI: CPT

## 2020-12-08 ENCOUNTER — ANNUAL EXAM (OUTPATIENT)
Dept: OBGYN CLINIC | Facility: MEDICAL CENTER | Age: 75
End: 2020-12-08
Payer: MEDICARE

## 2020-12-08 VITALS
WEIGHT: 159 LBS | BODY MASS INDEX: 26.49 KG/M2 | SYSTOLIC BLOOD PRESSURE: 126 MMHG | HEIGHT: 65 IN | DIASTOLIC BLOOD PRESSURE: 78 MMHG

## 2020-12-08 DIAGNOSIS — Z01.419 ENCOUNTER FOR WELL WOMAN EXAM WITH ROUTINE GYNECOLOGICAL EXAM: Primary | ICD-10-CM

## 2020-12-08 DIAGNOSIS — Z78.0 POST-MENOPAUSAL: ICD-10-CM

## 2020-12-08 DIAGNOSIS — Z12.31 ENCOUNTER FOR SCREENING MAMMOGRAM FOR BREAST CANCER: ICD-10-CM

## 2020-12-08 DIAGNOSIS — M85.80 OSTEOPENIA, UNSPECIFIED LOCATION: ICD-10-CM

## 2020-12-08 PROCEDURE — G0101 CA SCREEN;PELVIC/BREAST EXAM: HCPCS | Performed by: ADVANCED PRACTICE MIDWIFE

## 2021-01-22 DIAGNOSIS — Z23 ENCOUNTER FOR IMMUNIZATION: ICD-10-CM

## 2021-02-13 ENCOUNTER — IMMUNIZATIONS (OUTPATIENT)
Dept: FAMILY MEDICINE CLINIC | Facility: HOSPITAL | Age: 76
End: 2021-02-13

## 2021-02-13 DIAGNOSIS — Z23 ENCOUNTER FOR IMMUNIZATION: Primary | ICD-10-CM

## 2021-02-13 PROCEDURE — 91301 SARS-COV-2 / COVID-19 MRNA VACCINE (MODERNA) 100 MCG: CPT

## 2021-02-13 PROCEDURE — 0011A SARS-COV-2 / COVID-19 MRNA VACCINE (MODERNA) 100 MCG: CPT

## 2021-03-11 ENCOUNTER — IMMUNIZATIONS (OUTPATIENT)
Dept: FAMILY MEDICINE CLINIC | Facility: HOSPITAL | Age: 76
End: 2021-03-11

## 2021-03-11 DIAGNOSIS — Z23 ENCOUNTER FOR IMMUNIZATION: Primary | ICD-10-CM

## 2021-03-11 PROCEDURE — 0012A SARS-COV-2 / COVID-19 MRNA VACCINE (MODERNA) 100 MCG: CPT

## 2021-03-11 PROCEDURE — 91301 SARS-COV-2 / COVID-19 MRNA VACCINE (MODERNA) 100 MCG: CPT

## 2021-03-18 ENCOUNTER — HOSPITAL ENCOUNTER (OUTPATIENT)
Dept: MAMMOGRAPHY | Facility: MEDICAL CENTER | Age: 76
Discharge: HOME | End: 2021-03-18
Payer: MEDICARE

## 2021-03-18 ENCOUNTER — HOSPITAL ENCOUNTER (OUTPATIENT)
Dept: BONE DENSITY | Facility: MEDICAL CENTER | Age: 76
Discharge: HOME | End: 2021-03-18
Payer: MEDICARE

## 2021-03-18 VITALS — WEIGHT: 159 LBS | HEIGHT: 64 IN | BODY MASS INDEX: 27.14 KG/M2

## 2021-03-18 DIAGNOSIS — Z78.0 POST-MENOPAUSAL: ICD-10-CM

## 2021-03-18 DIAGNOSIS — Z12.31 ENCOUNTER FOR SCREENING MAMMOGRAM FOR BREAST CANCER: ICD-10-CM

## 2021-03-18 DIAGNOSIS — Z01.419 ENCOUNTER FOR WELL WOMAN EXAM WITH ROUTINE GYNECOLOGICAL EXAM: ICD-10-CM

## 2021-03-18 PROCEDURE — 77080 DXA BONE DENSITY AXIAL: CPT

## 2021-03-18 PROCEDURE — 77063 BREAST TOMOSYNTHESIS BI: CPT

## 2021-03-18 PROCEDURE — 77067 SCR MAMMO BI INCL CAD: CPT

## 2022-02-24 ENCOUNTER — ANNUAL EXAM (OUTPATIENT)
Dept: OBGYN CLINIC | Facility: MEDICAL CENTER | Age: 77
End: 2022-02-24
Payer: MEDICARE

## 2022-02-24 VITALS
WEIGHT: 160.2 LBS | BODY MASS INDEX: 27.35 KG/M2 | DIASTOLIC BLOOD PRESSURE: 74 MMHG | SYSTOLIC BLOOD PRESSURE: 138 MMHG | HEIGHT: 64 IN

## 2022-02-24 DIAGNOSIS — Z12.31 ENCOUNTER FOR SCREENING MAMMOGRAM FOR BREAST CANCER: ICD-10-CM

## 2022-02-24 DIAGNOSIS — Z01.419 ROUTINE GYNECOLOGICAL EXAMINATION: Primary | ICD-10-CM

## 2022-02-24 DIAGNOSIS — N81.10 VAGINAL WALL PROLAPSE: ICD-10-CM

## 2022-02-24 PROCEDURE — G0101 CA SCREEN;PELVIC/BREAST EXAM: HCPCS | Performed by: ADVANCED PRACTICE MIDWIFE

## 2022-02-24 RX ORDER — MECLIZINE HCL 12.5 MG/1
12.5 TABLET ORAL 3 TIMES DAILY PRN
COMMUNITY
Start: 2021-12-28

## 2022-02-24 NOTE — PROGRESS NOTES
[facility] [address] [place] [address]    ASSESSMENT/PLAN: Enmanuel Salcido is a 64 y o   who presents for annual gynecologic exam     Encounter for routine gynecologic examination  - Routine well woman exam completed today  - Cervical Cancer Screening complete   - STI screening offered including HIV: not indicated based on hx or requested at time of visit  - Breast Cancer Screening: Last Mammogram 2021, script  - Colorectal cancer screening was not ordered  States that she is scheduling   - The following were reviewed in today's visit: breast self exam, mammography screening ordered, adequate intake of calcium and vitamin D, exercise and age related changes  - RTO 1-2 yrs    Additional problems addressed at this visit:  1  Routine gynecological examination  -     Mammo screening bilateral w 3d & cad; Future; Expected date: 2022    2  Encounter for screening mammogram for breast cancer  -     Mammo screening bilateral w 3d & cad; Future; Expected date: 2022    3  Vaginal wall prolapse    - mild prolapse, asymptomatic  - will continue to monitor       CC:  Annual Gynecologic Examination    HPI: Enmanuel Salcido is a 64 y o   who presents for annual gynecologic examination  Mehdi Chandrafolk presents today for gyn exam  No vaginal bleeding since hysterectomy   last pap smear- normal, Hx of abnormal pap smear- no hx of abnormal  Sexually active- no  3/2021 mammogram- normal , and 5yrs since colonoscopy needs repeat this year  Reports interrupted hrs of sleep daily-  Feels that it has improved in past 1-2 yrs  2-3 servings of calcium rich foods daily, takes supplement  Exercises daily per week- walks, arerobics  Minimal servings of caffeine daily  Performs SBE  Concerns: none          The following portions of the patient's history were reviewed and updated as appropriate: allergies, current medications, past family history, past medical history, obstetric history, gynecologic history, past social history, past surgical history and problem list     Review of Systems   Constitutional: Negative for activity change, appetite change, fatigue and fever  Respiratory: Negative for cough and shortness of breath  Cardiovascular: Negative for chest pain, palpitations and leg swelling  Gastrointestinal: Negative for constipation and diarrhea  Genitourinary: Negative for difficulty urinating, frequency, pelvic pain, vaginal bleeding, vaginal discharge and vaginal pain  Neurological: Negative for light-headedness and headaches  Psychiatric/Behavioral: The patient is not nervous/anxious  Objective:  /74 (BP Location: Right arm, Patient Position: Sitting, Cuff Size: Large)   Ht 5' 4" (1 626 m)   Wt 72 7 kg (160 lb 3 2 oz)   LMP  (LMP Unknown)   BMI 27 50 kg/m²    Physical Exam  Vitals reviewed  Constitutional:       Appearance: Normal appearance  HENT:      Head: Normocephalic  Neck:      Thyroid: No thyroid mass or thyroid tenderness  Cardiovascular:      Rate and Rhythm: Normal rate and regular rhythm  Heart sounds: Normal heart sounds  Pulmonary:      Effort: Pulmonary effort is normal       Breath sounds: Normal breath sounds  Chest:   Breasts:      Right: No mass, nipple discharge, skin change, tenderness or axillary adenopathy  Left: No mass, nipple discharge, skin change, tenderness or axillary adenopathy  Abdominal:      General: There is no distension  Palpations: There is no mass  Tenderness: There is no abdominal tenderness  There is no guarding  Genitourinary:     General: Normal vulva  Exam position: Lithotomy position  Labia:         Right: No tenderness or lesion  Left: No tenderness or lesion  Vagina: No vaginal discharge, tenderness, bleeding or lesions  Uterus: Absent  Adnexa:         Right: No mass, tenderness or fullness            Left: No mass, tenderness or fullness  Comments: Mild vaginal prolapse, has good control or urine and bowel function  Musculoskeletal:      Cervical back: Normal range of motion  Lymphadenopathy:      Upper Body:      Right upper body: No axillary adenopathy  Left upper body: No axillary adenopathy  Skin:     General: Skin is warm and dry  Neurological:      Mental Status: She is alert     Psychiatric:         Mood and Affect: Mood normal          Behavior: Behavior normal          Judgment: Judgment normal              Milton Quiroz CNM  [facility]  02/24/22 3:09 PM

## 2022-02-24 NOTE — PATIENT INSTRUCTIONS
Kegel Exercises for Women   AMBULATORY CARE:   Kegel exercises  help strengthen your pelvic muscles  Pelvic muscles hold your pelvic organs, such as your bladder and uterus, in place  Kegel exercises help prevent or control problems with urine incontinence (leakage)  Incontinence may be caused by pregnancy, childbirth, or menopause  Contact your healthcare provider if:   · You cannot feel your muscles tighten or relax  · You continue to leak urine  · You have questions or concerns about your condition or care  Use the correct muscles:  Pelvic muscles are the muscles you use to control urine flow  To target these muscles, stop and start the flow of urine several times  This will help you become familiar with how it feels to tighten and relax these muscles  How to do Kegel exercises:   · Empty your bladder  You may lie down, stand up, or sit down to do these exercises  When you first try to do these exercises, it may be easier if you lie down  Tighten or squeeze your pelvic muscles slowly  It may feel like you are trying to hold back urine or gas  Hold this position for 3 seconds  Relax for 3 seconds  Repeat this cycle 10 times  · Do 10 sets of Kegel exercises, at least 3 times a day  Do not hold your breath when you do Kegel exercises  Keep your stomach, back, and leg muscles relaxed  · As your muscles get stronger, you will be able to hold the squeeze longer  Your healthcare provider may ask that you increase your pelvic muscle squeeze to 10 seconds  After you squeeze for 10 seconds, relax for 10 seconds  What else you should know:   · Once you know how to do Kegel exercises, use different positions  You can do these exercises while you lie on the floor, sit at your desk or watch TV, and while you stand  · You may notice improved bladder control within about 6 weeks  · Tighten your pelvic muscles before you sneeze, cough, or lift to prevent urine leakage      Follow up with your doctor as directed:  Write down your questions so you remember to ask them during your visits  © Copyright [employer] 2021 Information is for End User's use only and may not be sold, redistributed or otherwise used for commercial purposes  All illustrations and images included in CareNotes® are the copyrighted property of [employer]  or Radha Lemons  The above information is an  only  It is not intended as medical advice for individual conditions or treatments  Talk to your doctor, nurse or pharmacist before following any medical regimen to see if it is safe and effective for you

## 2022-04-01 ENCOUNTER — HOSPITAL ENCOUNTER (OUTPATIENT)
Dept: MAMMOGRAPHY | Facility: MEDICAL CENTER | Age: 77
Discharge: HOME | End: 2022-04-01
Payer: MEDICARE

## 2022-04-01 VITALS — BODY MASS INDEX: 27.31 KG/M2 | HEIGHT: 64 IN | WEIGHT: 160 LBS

## 2022-04-01 DIAGNOSIS — Z12.31 ENCOUNTER FOR SCREENING MAMMOGRAM FOR BREAST CANCER: ICD-10-CM

## 2022-04-01 PROCEDURE — 77063 BREAST TOMOSYNTHESIS BI: CPT

## 2022-04-01 PROCEDURE — 77067 SCR MAMMO BI INCL CAD: CPT

## 2022-05-27 PROBLEM — M19.90 DJD (DEGENERATIVE JOINT DISEASE): Status: ACTIVE | Noted: 2019-06-05

## 2022-08-24 ENCOUNTER — OFFICE VISIT (OUTPATIENT)
Dept: UROLOGY | Facility: CLINIC | Age: 77
End: 2022-08-24
Payer: MEDICARE

## 2022-08-24 VITALS
SYSTOLIC BLOOD PRESSURE: 122 MMHG | BODY MASS INDEX: 27.81 KG/M2 | HEART RATE: 64 BPM | WEIGHT: 162 LBS | DIASTOLIC BLOOD PRESSURE: 78 MMHG

## 2022-08-24 DIAGNOSIS — N39.0 RECURRENT UTI: Primary | ICD-10-CM

## 2022-08-24 LAB — POST-VOID RESIDUAL VOLUME, ML POC: 238 ML

## 2022-08-24 PROCEDURE — 99204 OFFICE O/P NEW MOD 45 MIN: CPT | Performed by: UROLOGY

## 2022-08-24 PROCEDURE — 51798 US URINE CAPACITY MEASURE: CPT | Performed by: UROLOGY

## 2022-08-24 RX ORDER — ESTRADIOL 10 UG/1
1 INSERT VAGINAL 2 TIMES WEEKLY
Qty: 10 TABLET | Refills: 6 | Status: SHIPPED | OUTPATIENT
Start: 2022-08-25

## 2022-08-24 NOTE — PATIENT INSTRUCTIONS
Good fluid hydration, control of bowel habits with avoidance of constipation, 100% cranberry juice or cranberry supplement tabs, pro- or prebiotics, and D-mannose (a supplement available OTC which reduces bacterial binding to the bladder wall) have all been shown to improve recurrent urinary infection

## 2022-08-24 NOTE — PROGRESS NOTES
CC:  Pal Merino is here today for consult for GERD, discuss colonoscopy/EGD.  Symptoms:  0  Medications: medications verified and updated  Refills needed today?  NO  denies known Latex allergy or symptoms of Latex sensitivity.  Patient would like communication of their results via:    Cell Phone: [phone]  No relevant phone numbers on file.     Okay to leave a message containing results? Yes    Family History of any GI Disorders:    History of GERD: NO  History of Colon cancer: NO  History of Crohn's: NO  History of Ulcerative Colitis or Crohn's: NO  History of IBS: NO  History of Celiac: NO  History of other GI related illness or cancers such as pancreatic cancer, gallbladder problems, liver disease? NO  Today's visit was performed with the assistance of  Services.   Name of : [id number]   Service used: Phone : Third Party        UROLOGY NEW CONSULT NOTE     CHIEF COMPLAINT   Sourav Martines is a 76 y o  female with a complaint of No chief complaint on file  History of Present Illness:     76 y o  female with remote history of recurrent UTIs  Prior urology physician cared for the patient and she was given on demand prescriptions for trimethoprim  Last year, the patient only had 1 urinary infection but this year has had 3 infections in short succession  Most recent culture demonstrates Pseudomonas  Patient has a reported history of pelvic organ prolapse documented by her gyn team   She does not have any concerns for bulge  Has very minimal lower urinary tract symptoms and no issues with her bowels  Past Medical History:     Past Medical History:   Diagnosis Date    Anxiety     Back pain     Chronic cystitis     DJD (degenerative joint disease) 6/5/2019    Dysuria     Frequent urination     Heartburn     Hematuria     Hypertension     Seasonal allergies     Sinusitis     UTI (urinary tract infection)        PAST SURGICAL HISTORY:     Past Surgical History:   Procedure Laterality Date    APPENDECTOMY      BUNIONECTOMY      COLONOSCOPY  03/2017    Dr Tip Pérez, [facility]  Internal hemorrhoids, diverticulosis in the sigmoid and descending colon, otherwise normal   5 year recall   COLONOSCOPY  2011    Dr Glynn Osei, at [facility]  Left diverticulosis, internal hemorrhoid ulcerated as source of rectal bleeding, otherwise normal exam     COLONOSCOPY  2008    Dr Glynn Osei, at [address]  Sigmoid tubular adenoma polyp removed      HYSTERECTOMY      at age 74   [address] OOPHORECTOMY      age 74    [address]:     Current Outpatient Medications   Medication Sig Dispense Refill    amLODIPine (NORVASC) 2 5 mg tablet   0    BLACK ELDERBERRY,BERRY-FLOWER, PO Take by mouth      calcium-vitamin D (OSCAL 500 + D) 500 mg-200 units per tablet Take by mouth      CRANBERRY FRUIT PO Take by mouth  desloratadine (CLARINEX) 5 MG tablet   0    escitalopram (LEXAPRO) 10 mg tablet   0    [START ON 8/25/2022] estradiol (VAGIFEM, YUVAFEM) 10 MCG TABS vaginal tablet Insert 1 tablet (10 mcg total) into the vagina 2 (two) times a week 10 tablet 6    meclizine (ANTIVERT) 12 5 MG tablet Take 12 5 mg by mouth 3 (three) times a day as needed      Multiple Vitamin (MULTIVITAMIN) tablet Take 1 tablet by mouth daily      simvastatin (ZOCOR) 20 mg tablet Take 20 mg by mouth every evening  0    aspirin 81 MG tablet Take 1 tablet by mouth daily (Patient not taking: No sig reported)      fluticasone (FLONASE) 50 mcg/act nasal spray 2 sprays daily (Patient not taking: No sig reported)  0     No current facility-administered medications for this visit         ALLERGIES:     Allergies   Allergen Reactions    Benzocaine Other (See Comments)    Ciprofloxacin Other (See Comments)    Codeine Other (See Comments)    Ibuprofen Other (See Comments)     takes ASA at home  Reaction Date: 17Jun2011;     Mangifera Indica      Other reaction(s): numbness of lips  (NITO)    Nitrofurantoin Other (See Comments)     Other reaction(s): Flu like sysptoms  Reaction Date: 17Jun2011;     Latex Other (See Comments) and Rash     Reaction Date: 17Jun2011;     Sulfa Antibiotics Other (See Comments)    Sulfacetamide Rash    Sulfasalazine Other (See Comments)       SOCIAL HISTORY:     Social History     Socioeconomic History    Marital status: /Civil Union     Spouse name: None    Number of children: None    Years of education: None    Highest education level: None   Occupational History    Occupation: Rtired     Comment:    Tobacco Use    Smoking status: Never Smoker    Smokeless tobacco: Never Used   Vaping Use    Vaping Use: Never used   Substance and Sexual Activity    Alcohol use: No    Drug use: No    Sexual activity: Not Currently   Other Topics Concern    None   Social History Narrative    None Social Determinants of Health     Financial Resource Strain: Not on file   Food Insecurity: Not on file   Transportation Needs: Not on file   Physical Activity: Not on file   Stress: Not on file   Social Connections: Not on file   Intimate Partner Violence: Not on file   Housing Stability: Not on file       SOCIAL HISTORY:     Family History   Problem Relation Age of Onset    Kidney failure Mother     No Known Problems Father     No Known Problems Sister     No Known Problems Maternal Grandmother     No Known Problems Maternal Grandfather     No Known Problems Paternal Grandmother     No Known Problems Paternal Grandfather     No Known Problems Maternal Aunt     No Known Problems Maternal Aunt     No Known Problems Maternal Aunt     No Known Problems Paternal Aunt     No Known Problems Paternal Aunt     No Known Problems Paternal Aunt     No Known Problems Paternal Aunt     Diabetes Family        REVIEW OF SYSTEMS:     Review of Systems   Constitutional: Negative  Respiratory: Negative  Cardiovascular: Negative  Gastrointestinal: Negative  Genitourinary: Negative  Musculoskeletal: Negative  Skin: Negative  Psychiatric/Behavioral: Negative  PHYSICAL EXAM:     /78   Pulse 64   Wt 73 5 kg (162 lb)   LMP  (LMP Unknown)   BMI 27 81 kg/m²     General:  Healthy appearing female in no acute distress  They have a normal affect  There is not appear to be any gross neurologic defects or abnormalities  HEENT:  Normocephalic, atraumatic  Neck is supple without any palpable lymphadenopathy  Cardiovascular:  Patient has normal palpable distal radial pulses  There is no significant peripheral edema  No JVD is noted  Respiratory:  Patient has unlabored respirations  There is no audible wheeze or rhonchi  Abdomen:   Abdomen is soft and nontender  There is no tympany  Inguinal and umbilical hernia are not appreciated      Genitourinary:  Deferred      Musculoskeletal: Patient does not have significant CVA tenderness in the  flank with palpation or percussion  They full range of motion in all 4 extremities  Strength in all 4 extremities appears congruent  Patient is able to ambulate without assistance or difficulty  Dermatologic:  Patient has no skin abnormalities or rashes  LABS:     CBC: No results found for: WBC, HGB, HCT, MCV, PLT    BMP: No results found for: GLUCOSE, CALCIUM, NA, K, CO2, CL, BUN, CREATININE    Culture Results  >100,000 CFU/ml   **PSEUDOMONAS AERUGINOSA**    Resulting Agency [facility]     Susceptibility    Organism Antibiotic Method Susceptibility   **PSEUDOMONAS AERUGINOSA** CEFEPIME GRAM NEGATIVE SUSCEPTIBILITY 4: Susceptible   **PSEUDOMONAS AERUGINOSA** MEROPENEM GRAM NEGATIVE SUSCEPTIBILITY 1: Susceptible   **PSEUDOMONAS AERUGINOSA** GENTAMICIN GRAM NEGATIVE SUSCEPTIBILITY 4: Susceptible   **PSEUDOMONAS AERUGINOSA** CIPRO GRAM NEGATIVE SUSCEPTIBILITY <=0 25: Susceptible   **PSEUDOMONAS AERUGINOSA** CEFTAZIDIME  GRAM NEGATIVE SUSCEPTIBILITY 8: Susceptible   Specimen Collected: 22 11:37 AM Last Resulted: 22  8:40 AM       IMAGIN  US RENAL KIDNEY      Narrative    Examination: Renal ultrasound  Indication: Hematuria     [place]: 10/23/2012     Findings: The right kidney measures 11 0 cm in length   The   corticomedullary echotexture is within normal limits  No evidence of   calculus, mass, or hydronephrosis  There is a simple cyst at the   lower pole measuring approximately 1 7 x 1 5 x 1 4 cm  The left kidney measures 10 7 cm in length  The corticomedullary   echotexture is within normal limits  There is a tiny echogenic focus   of the lower pole of the left kidney without clear evidence of   acoustic shadowing on the current exam although there was a   suggestion of shadowing on the prior study   This measures roughly 5   mm x 4 mm and probably reflects either a nonobstructing calculus or   vascular calcification  Urinary bladder is grossly normal  The pre-void bladder volume   measures 75 cubic centimeters  Impression:     1  No suspicious renal masses   2  There is a simple cyst noted along the lower pole of the right   kidney  3  Echogenic focus along the lower pole the left kidney without   acoustic shadowing, stable  This probably reflects either a   nonobstructing calculus versus vascular calcification  PROCEDURE:     Recent Results (from the past 2 hour(s))   POCT Measure PVR    Collection Time: 08/24/22  3:00 PM   Result Value Ref Range    POST-VOID RESIDUAL VOLUME, ML  mL        ASSESSMENT:     76 y o  female with recurrent UTI    PLAN:     Discussed with the patient my recommendation for urine testing to ensure completeness of treatment  She is unable provide specimen today despite a mildly elevated postvoid residual   She will return either later today or tomorrow to deliver specimen  I think it is worthwhile to consider repeat postvoid residual within the next 2 weeks given the mildly elevated value  If the patient holds urine, this may be a risk factor for recurrent infections and may be related to her history of pelvic organ prolapse  I did strongly recommend the start of twice weekly vaginal estrogen suppositories  We discussed the utility of replacement of topical hormones to the vaginal and urethral area to help prevent recurrent infections  We also discussed conservative measures including good fluid hydration, bowel control, cranberry supplements with or without the addition of D mannose and early testing and treatment in the setting of concerns for UTI  Assuming the patient has proof of treatment on her upcoming urine studies and an improved postvoid residual, follow-up in 6 months

## 2022-08-24 NOTE — Clinical Note
Patient had a mildly elevated postvoid residual at today's visit  Given her history of reported pelvic organ prolapse and recurrent UTIs, can we please arrange a nursing visit in 2 weeks for postvoid residual recheck  Patient will be returning in the near future to deliver a urine specimen for culture and prove of testing  If we do not receive this urine study within the next 2 weeks, can be obtained at the nursing visit    Please let me know if the postvoid residual was higher than 150 cc

## 2022-08-25 ENCOUNTER — APPOINTMENT (OUTPATIENT)
Dept: LAB | Facility: MEDICAL CENTER | Age: 77
End: 2022-08-25
Payer: MEDICARE

## 2022-08-25 ENCOUNTER — TELEPHONE (OUTPATIENT)
Dept: UROLOGY | Facility: CLINIC | Age: 77
End: 2022-08-25

## 2022-08-25 DIAGNOSIS — N39.0 RECURRENT UTI: ICD-10-CM

## 2022-08-25 PROCEDURE — 87086 URINE CULTURE/COLONY COUNT: CPT

## 2022-08-25 NOTE — TELEPHONE ENCOUNTER
Called and spoke with Jessenia Ram  She was scheduled for PVR nurse visit on 9/8/22 @ 10:30 in the [facility]  States she will be going to the lab this morning to provide urine sample

## 2022-08-26 LAB — BACTERIA UR CULT: NORMAL

## 2022-09-02 ENCOUNTER — TELEPHONE (OUTPATIENT)
Dept: OTHER | Facility: OTHER | Age: 77
End: 2022-09-02

## 2022-09-02 NOTE — TELEPHONE ENCOUNTER
Contacted patient and re-scheduled nurse visit appointment for PVR from 9/8 to 9/9 per patient request

## 2022-09-09 ENCOUNTER — PROCEDURE VISIT (OUTPATIENT)
Dept: UROLOGY | Facility: CLINIC | Age: 77
End: 2022-09-09
Payer: MEDICARE

## 2022-09-09 VITALS
DIASTOLIC BLOOD PRESSURE: 80 MMHG | BODY MASS INDEX: 27.81 KG/M2 | SYSTOLIC BLOOD PRESSURE: 120 MMHG | HEART RATE: 68 BPM | WEIGHT: 162 LBS

## 2022-09-09 DIAGNOSIS — N39.0 RECURRENT UTI: Primary | ICD-10-CM

## 2022-09-09 LAB — POST-VOID RESIDUAL VOLUME, ML POC: 77 ML

## 2022-09-09 PROCEDURE — 51798 US URINE CAPACITY MEASURE: CPT

## 2022-09-09 NOTE — PROGRESS NOTES
2022  Nancy Menon is a 76 y o  female  [id number]    Diagnosis:  Chief Complaint     Urinary Tract Infection          Patient presents for follow up post void residual  managed by Dr Garett Fulton:  Follow up in 6 months with AP  Assessment:      Vitals:    22 1040   BP: 120/80   Pulse: 68   Weight: 73 5 kg (162 lb)           Patient voided 0 mL in the office    Post void residual measured via bladder scan to be 77 mL  Recent Results (from the past 1 hour(s))   POCT Measure PVR    Collection Time: 22  1:28 PM   Result Value Ref Range    POST-VOID RESIDUAL VOLUME, ML POC 77 mL         Ian Rock RN

## 2023-02-24 ENCOUNTER — OFFICE VISIT (OUTPATIENT)
Dept: UROLOGY | Facility: CLINIC | Age: 78
End: 2023-02-24

## 2023-02-24 VITALS
HEART RATE: 68 BPM | DIASTOLIC BLOOD PRESSURE: 62 MMHG | SYSTOLIC BLOOD PRESSURE: 110 MMHG | BODY MASS INDEX: 27.66 KG/M2 | HEIGHT: 64 IN | WEIGHT: 162 LBS

## 2023-02-24 DIAGNOSIS — N39.0 RECURRENT UTI: Primary | ICD-10-CM

## 2023-02-24 LAB
POST-VOID RESIDUAL VOLUME, ML POC: 108 ML
SL AMB  POCT GLUCOSE, UA: ABNORMAL
SL AMB LEUKOCYTE ESTERASE,UA: ABNORMAL
SL AMB POCT BILIRUBIN,UA: ABNORMAL
SL AMB POCT BLOOD,UA: ABNORMAL
SL AMB POCT CLARITY,UA: CLEAR
SL AMB POCT COLOR,UA: YELLOW
SL AMB POCT KETONES,UA: ABNORMAL
SL AMB POCT NITRITE,UA: ABNORMAL
SL AMB POCT PH,UA: 7.5
SL AMB POCT SPECIFIC GRAVITY,UA: 1
SL AMB POCT URINE PROTEIN: ABNORMAL
SL AMB POCT UROBILINOGEN: 0.2

## 2023-02-24 NOTE — PROGRESS NOTES
2/24/2023      Chief Complaint   Patient presents with   • Follow-up     Recurrent UTI            Assessment and Plan    76 y o  female managed by Dr Acacia Gardner    1  Recurrent UTI  2  Vaginal atrophy  3  Pelvic organ prolapse    She is currently symptom free, no breakthrough infections and no daily bladder bother  She feels great  Will continue twice weekly vagifem suppositories and oral cranberry  She will call us directly for any breakthrough symptoms for culture testing/monitoring  Followup in 1 yr  History of Present Illness  Marcos Yadav is a 76 y o  female here for evaluation of six month followup recurrent UTI  No symptomatic UTI or bladder bother since index visit  Her culture was negative for residual bacteriuria  PVR 77ml  She has been using the vagifem tablets twice weekly  She feels well  Review of Systems   Constitutional: Negative  Respiratory: Negative  Cardiovascular: Negative  Genitourinary: Negative for decreased urine volume, difficulty urinating, dysuria, flank pain, frequency, hematuria, pelvic pain, urgency, vaginal bleeding, vaginal discharge and vaginal pain  Musculoskeletal: Negative  Urinary Incontinence Screening    Flowsheet Row Most Recent Value   Urinary Incontinence    Urinary Incontinence? No   Incomplete emptying? No   Urinary frequency? No   Urinary urgency? No   Urinary hesitancy? No   Dysuria (painful difficult urination)? No   Nocturia (waking up to use the bathroom)? Yes  [2x]   Straining (having to push to go)? No   Weak stream? Yes  [sometimes, it varies]   Intermittent stream? No   Post void dribbling? No               Vitals  Vitals:    02/24/23 1028   BP: 110/62   BP Location: Left arm   Patient Position: Sitting   Cuff Size: Adult   Pulse: 68   Weight: 73 5 kg (162 lb)   Height: 5' 4" (1 626 m)       Physical Exam  Vitals and nursing note reviewed  Constitutional:       General: She is not in acute distress       Appearance: Normal appearance  She is well-developed  She is not diaphoretic  HENT:      Head: Normocephalic and atraumatic  Pulmonary:      Effort: Pulmonary effort is normal    Musculoskeletal:      Right lower leg: No edema  Left lower leg: No edema  Skin:     General: Skin is warm and dry  Neurological:      General: No focal deficit present  Mental Status: She is alert and oriented to person, place, and time     Psychiatric:         Mood and Affect: Mood normal          Speech: Speech normal          Behavior: Behavior normal            Past History  Past Medical History:   Diagnosis Date   • Anxiety    • Back pain    • Chronic cystitis    • DJD (degenerative joint disease) 06/05/2019   • Dysuria    • Frequent urination    • Heartburn    • Hematuria    • Hypertension    • Seasonal allergies    • Sinusitis    • Urinary tract infection    • UTI (urinary tract infection)      Social History     Socioeconomic History   • Marital status: /Civil Union     Spouse name: None   • Number of children: None   • Years of education: None   • Highest education level: None   Occupational History   • Occupation: Rtired     Comment:    Tobacco Use   • Smoking status: Never   • Smokeless tobacco: Never   Vaping Use   • Vaping Use: Never used   Substance and Sexual Activity   • Alcohol use: No   • Drug use: No   • Sexual activity: Not Currently   Other Topics Concern   • None   Social History Narrative   • None     Social Determinants of Health     Financial Resource Strain: Not on file   Food Insecurity: Not on file   Transportation Needs: Not on file   Physical Activity: Not on file   Stress: Not on file   Social Connections: Not on file   Intimate Partner Violence: Not on file   Housing Stability: Not on file     Social History     Tobacco Use   Smoking Status Never   Smokeless Tobacco Never     Family History   Problem Relation Age of Onset   • Kidney failure Mother    • No Known Problems Father    • No Known Problems Sister    • No Known Problems Maternal Grandmother    • No Known Problems Maternal Grandfather    • No Known Problems Paternal Grandmother    • No Known Problems Paternal Grandfather    • No Known Problems Maternal Aunt    • No Known Problems Maternal Aunt    • No Known Problems Maternal Aunt    • No Known Problems Paternal Aunt    • No Known Problems Paternal Aunt    • No Known Problems Paternal Aunt    • No Known Problems Paternal Aunt    • Diabetes Family        The following portions of the patient's history were reviewed and updated as appropriate: allergies, current medications, past medical history, past social history, past surgical history and problem list     Results  Recent Results (from the past 1 hour(s))   POCT Measure PVR    Collection Time: 02/24/23 10:34 AM   Result Value Ref Range    POST-VOID RESIDUAL VOLUME, ML  mL   POCT urine dip    Collection Time: 02/24/23 10:46 AM   Result Value Ref Range    LEUKOCYTE ESTERASE,UA +     NITRITE,UA -     SL AMB POCT UROBILINOGEN 0 2     POCT URINE PROTEIN -      PH,UA 7 5     BLOOD,UA mod     SPECIFIC GRAVITY,UA 1 000     KETONES,UA -     BILIRUBIN,UA -     GLUCOSE, UA -      COLOR,UA yellow     CLARITY,UA clear    ]  No results found for: PSA  No results found for: GLUCOSE, CALCIUM, NA, K, CO2, CL, BUN, CREATININE  No results found for: WBC, HGB, HCT, MCV, PLT

## 2023-03-02 ENCOUNTER — ANNUAL EXAM (OUTPATIENT)
Dept: OBGYN CLINIC | Facility: MEDICAL CENTER | Age: 78
End: 2023-03-02

## 2023-03-02 VITALS
WEIGHT: 161.1 LBS | BODY MASS INDEX: 27.5 KG/M2 | SYSTOLIC BLOOD PRESSURE: 136 MMHG | HEIGHT: 64 IN | DIASTOLIC BLOOD PRESSURE: 64 MMHG

## 2023-03-02 DIAGNOSIS — M85.80 OSTEOPENIA, UNSPECIFIED LOCATION: ICD-10-CM

## 2023-03-02 DIAGNOSIS — Z78.0 POSTMENOPAUSAL: ICD-10-CM

## 2023-03-02 DIAGNOSIS — N81.10 VAGINAL WALL PROLAPSE: ICD-10-CM

## 2023-03-02 DIAGNOSIS — Z12.31 ENCOUNTER FOR SCREENING MAMMOGRAM FOR BREAST CANCER: ICD-10-CM

## 2023-03-02 DIAGNOSIS — Z01.419 ROUTINE GYNECOLOGICAL EXAMINATION: Primary | ICD-10-CM

## 2023-03-02 NOTE — PROGRESS NOTES
[facility] [address] [place] [address]    ASSESSMENT/PLAN: Aimee Lewis is a 76 y o  Gilbert Torres who presents for annual gynecologic exam     Encounter for routine gynecologic examination  - Routine well woman exam completed today  - Cervical Cancer Screening complete   - STI screening offered including HIV: not indicated  - Breast Cancer Screening: Last Mammogram 04/01/2022, script given  - Colorectal cancer screening was not ordered  - The following were reviewed in today's visit: breast self exam, mammography screening ordered, adequate intake of calcium and vitamin D, exercise and DEXA ordered  - RTO 2 yrs or PRN    Additional problems addressed at this visit:  1  Routine gynecological examination  -     Mammo screening bilateral w 3d & cad; Future; Expected date: 03/02/2023    2  Encounter for screening mammogram for breast cancer  -     Mammo screening bilateral w 3d & cad; Future; Expected date: 03/02/2023    3  Vaginal wall prolapse       - stable    4  Osteopenia, unspecified location  -     DXA bone density spine hip and pelvis; Future; Expected date: 03/02/2023    5  Postmenopausal  -     DXA bone density spine hip and pelvis; Future; Expected date: 03/02/2023          CC: Annual Gynecologic Examination    HPI: Aimee Lewis is a 76 y o  Gilbert Torres who presents for annual gynecologic examination  Brittanymarek Gutierrez presents today for gyn exam  No vaginal bleeding since hysterectomy  2010 last pap smear, Hx of abnormal pap smear- no  Sexually active- no  4/2022 mammogram- normal , and 7/2022 colonoscopy- no polyps- no repeat  Reports interrupted hrs of sleep daily  Takes multivitamin and calcium plus D   1-2  servings of calcium rich foods daily  Exercises : daily walking and aerobics  Minimal servings of caffeine daily  Occasionally SBE  Concerns: vaginal prolapse- occasionally leaks urine with coughing and sneezing                The following portions of the patient's history were reviewed and updated as appropriate: allergies, current medications, past family history, past medical history, obstetric history, gynecologic history, past social history, past surgical history and problem list     Review of Systems   Constitutional: Negative for activity change, appetite change, fatigue and fever  Respiratory: Negative for cough and shortness of breath  Cardiovascular: Negative for chest pain, palpitations and leg swelling  Gastrointestinal: Negative for constipation and diarrhea  Genitourinary: Negative for difficulty urinating and frequency  Neurological: Negative for light-headedness and headaches  Psychiatric/Behavioral: The patient is not nervous/anxious  Objective:  /64   Ht 5' 4" (1 626 m)   Wt 73 1 kg (161 lb 1 6 oz)   LMP  (LMP Unknown)   BMI 27 65 kg/m²    Physical Exam  Vitals reviewed  Constitutional:       Appearance: Normal appearance  HENT:      Head: Normocephalic  Neck:      Thyroid: No thyroid mass or thyroid tenderness  Cardiovascular:      Rate and Rhythm: Normal rate and regular rhythm  Heart sounds: Normal heart sounds  Pulmonary:      Effort: Pulmonary effort is normal       Breath sounds: Normal breath sounds  Chest:   Breasts:     Right: No mass, nipple discharge, skin change or tenderness  Left: No mass, nipple discharge, skin change or tenderness  Abdominal:      General: There is no distension  Palpations: There is no mass  Tenderness: There is no abdominal tenderness  There is no guarding  Genitourinary:     General: Normal vulva  Exam position: Lithotomy position  Labia:         Right: No tenderness or lesion  Left: No tenderness or lesion  Vagina: No vaginal discharge, bleeding or lesions  Adnexa:         Right: No mass, tenderness or fullness  Left: No mass, tenderness or fullness          Comments: Vaginal cuff pink, moist, without lesion  - cystocele unchanged, not symptomatic  Musculoskeletal:      Cervical back: Normal range of motion  Lymphadenopathy:      Upper Body:      Right upper body: No axillary adenopathy  Left upper body: No axillary adenopathy  Skin:     General: Skin is warm and dry  Neurological:      Mental Status: She is alert     Psychiatric:         Mood and Affect: Mood normal          Behavior: Behavior normal          Judgment: Judgment normal              Lesa Cartagena CNM  [facility]  03/02/23 4:07 PM

## 2023-04-25 DIAGNOSIS — N39.0 RECURRENT UTI: ICD-10-CM

## 2023-04-25 RX ORDER — ESTRADIOL 10 UG/1
1 INSERT VAGINAL 2 TIMES WEEKLY
Qty: 10 TABLET | Refills: 6 | Status: SHIPPED | OUTPATIENT
Start: 2023-04-27

## 2023-04-27 ENCOUNTER — HOSPITAL ENCOUNTER (OUTPATIENT)
Dept: MAMMOGRAPHY | Facility: MEDICAL CENTER | Age: 78
Discharge: HOME | End: 2023-04-27

## 2023-04-27 ENCOUNTER — HOSPITAL ENCOUNTER (OUTPATIENT)
Dept: BONE DENSITY | Facility: MEDICAL CENTER | Age: 78
Discharge: HOME | End: 2023-04-27

## 2023-04-27 VITALS — BODY MASS INDEX: 25.83 KG/M2 | HEIGHT: 65 IN | WEIGHT: 155 LBS

## 2023-04-27 DIAGNOSIS — M85.80 OSTEOPENIA, UNSPECIFIED LOCATION: ICD-10-CM

## 2023-04-27 DIAGNOSIS — Z78.0 POSTMENOPAUSAL: ICD-10-CM

## 2023-04-27 DIAGNOSIS — Z12.31 ENCOUNTER FOR SCREENING MAMMOGRAM FOR BREAST CANCER: ICD-10-CM

## 2023-04-27 DIAGNOSIS — Z01.419 ROUTINE GYNECOLOGICAL EXAMINATION: ICD-10-CM

## 2024-02-21 PROBLEM — N39.0 RECURRENT UTI: Status: RESOLVED | Noted: 2022-08-24 | Resolved: 2024-02-21

## 2024-03-06 NOTE — PROGRESS NOTES
[facility] [address], [place], [place]    ASSESSMENT/PLAN: Gina Olivas is a 78 y.o.  who presents for annual gynecologic exam.    Encounter for routine gynecologic examination  - Routine well woman exam completed today.  - Cervical Cancer Screening complete   - STI screening offered including HIV: not indicated based on hx or requested at time of visit  - Breast Cancer Screening: Last Mammogram 2023, script given  - Colorectal cancer screening was not ordered.  - The following were reviewed in today's visit: breast self exam, mammography screening ordered, adequate intake of calcium and vitamin D, and exercise    Additional problems addressed at this visit:  1. Routine gynecological examination  -     Mammo screening bilateral w 3d & cad; Future; Expected date: 2024    2. Vaginal wall prolapse    3. Encounter for screening mammogram for breast cancer  -     Mammo screening bilateral w 3d & cad; Future; Expected date: 2024    4. Cystocele, midline    - minimal symptoms      CC:  Annual Gynecologic Examination    HPI: Gina Olivas is a 78 y.o.  who presents for annual gynecologic examination.  Gina presents today for gyn exam. Occasional vaginal irritation with scant bleeding since hysterectomy.   last pap smear- normal, Hx of abnormal pap smear no. Sexually active- no. 2023 mammogram- normal , and  colonoscopy. Reports 7 hrs of sleep daily, 1-2 servings of calcium rich foods daily. Exercises: 5-6 days per week- primarily walking. Minimal servings of caffeine daily. Performs SBE.  Concerns: taking Vagifem for bladder and urinary problems- related to vaginal atrophy. Would like to continue with treatment.         The following portions of the patient's history were reviewed and updated as appropriate: allergies, current medications, past family history, past medical history, obstetric history, gynecologic history, past social  "history, past surgical history and problem list.    Review of Systems   Constitutional:  Negative for chills, fatigue and fever.   Respiratory:  Positive for cough. Negative for shortness of breath.         Seeing ENT   Cardiovascular:  Negative for chest pain, palpitations and leg swelling.   Gastrointestinal:  Negative for constipation and diarrhea.   Genitourinary:  Positive for frequency and urgency. Negative for difficulty urinating, dysuria, pelvic pain, vaginal bleeding, vaginal discharge and vaginal pain.   Neurological:  Negative for light-headedness and headaches.   Psychiatric/Behavioral:  The patient is not nervous/anxious.          Objective:  /66   Ht 5' 4\" (1.626 m)   Wt 73.3 kg (161 lb 8 oz)   LMP  (LMP Unknown)   BMI 27.72 kg/m²    Physical Exam  Vitals reviewed.   Constitutional:       Appearance: Normal appearance.   HENT:      Head: Normocephalic.   Neck:      Thyroid: No thyroid mass or thyroid tenderness.   Cardiovascular:      Rate and Rhythm: Normal rate and regular rhythm.      Heart sounds: Normal heart sounds.   Pulmonary:      Effort: Pulmonary effort is normal.      Breath sounds: Normal breath sounds.   Chest:   Breasts:     Right: No mass, nipple discharge, skin change or tenderness.      Left: No mass, nipple discharge, skin change or tenderness.   Abdominal:      General: There is no distension.      Palpations: There is no mass.      Tenderness: There is no abdominal tenderness. There is no guarding.   Genitourinary:     General: Normal vulva.      Exam position: Lithotomy position.      Labia:         Right: No tenderness or lesion.         Left: No tenderness or lesion.       Vagina: No vaginal discharge, tenderness, bleeding or lesions.      Uterus: Absent.       Adnexa:         Right: No mass, tenderness or fullness.          Left: No mass, tenderness or fullness.        Comments: Mild, midline cystocele. Some increase with valsalva. Minimal hx of leaking of urine. " Continues to use vaginal estrogen.   Musculoskeletal:      Cervical back: Normal range of motion.   Lymphadenopathy:      Upper Body:      Right upper body: No axillary adenopathy.      Left upper body: No axillary adenopathy.   Skin:     General: Skin is warm and dry.   Neurological:      Mental Status: She is alert.   Psychiatric:         Mood and Affect: Mood normal.         Behavior: Behavior normal.         Judgment: Judgment normal.             Nicole Post CNM  [facility]  03/11/24 8:57 AM

## 2024-03-07 ENCOUNTER — ANNUAL EXAM (OUTPATIENT)
Dept: OBGYN CLINIC | Facility: MEDICAL CENTER | Age: 79
End: 2024-03-07
Payer: MEDICARE

## 2024-03-07 VITALS
HEIGHT: 64 IN | DIASTOLIC BLOOD PRESSURE: 66 MMHG | WEIGHT: 161.5 LBS | SYSTOLIC BLOOD PRESSURE: 128 MMHG | BODY MASS INDEX: 27.57 KG/M2

## 2024-03-07 DIAGNOSIS — Z12.31 ENCOUNTER FOR SCREENING MAMMOGRAM FOR BREAST CANCER: ICD-10-CM

## 2024-03-07 DIAGNOSIS — N81.10 VAGINAL WALL PROLAPSE: ICD-10-CM

## 2024-03-07 DIAGNOSIS — Z01.419 ROUTINE GYNECOLOGICAL EXAMINATION: Primary | ICD-10-CM

## 2024-03-07 DIAGNOSIS — N81.11 CYSTOCELE, MIDLINE: ICD-10-CM

## 2024-03-07 PROCEDURE — G0101 CA SCREEN;PELVIC/BREAST EXAM: HCPCS | Performed by: ADVANCED PRACTICE MIDWIFE

## 2024-03-07 RX ORDER — AMLODIPINE BESYLATE 2.5 MG/1
TABLET ORAL
COMMUNITY
Start: 2024-03-06

## 2024-03-07 RX ORDER — MULTIVIT-MIN/FOLIC/VIT K/LYCOP 400-300MCG
200 TABLET ORAL DAILY
COMMUNITY

## 2024-03-07 RX ORDER — ASPIRIN 81 MG/1
81 TABLET ORAL DAILY
COMMUNITY

## 2024-03-27 DIAGNOSIS — N39.0 RECURRENT UTI: ICD-10-CM

## 2024-03-27 RX ORDER — ESTRADIOL 10 UG/1
1 INSERT VAGINAL 2 TIMES WEEKLY
Qty: 10 TABLET | Refills: 6 | Status: SHIPPED | OUTPATIENT
Start: 2024-03-28

## 2024-04-29 ENCOUNTER — HOSPITAL ENCOUNTER (OUTPATIENT)
Dept: MAMMOGRAPHY | Facility: MEDICAL CENTER | Age: 79
Discharge: HOME | End: 2024-04-29
Payer: MEDICARE

## 2024-04-29 VITALS — HEIGHT: 64 IN | WEIGHT: 161.6 LBS | BODY MASS INDEX: 27.59 KG/M2

## 2024-04-29 DIAGNOSIS — Z12.31 ENCOUNTER FOR SCREENING MAMMOGRAM FOR BREAST CANCER: ICD-10-CM

## 2024-04-29 DIAGNOSIS — Z01.419 ROUTINE GYNECOLOGICAL EXAMINATION: ICD-10-CM

## 2024-04-29 PROCEDURE — 77063 BREAST TOMOSYNTHESIS BI: CPT

## 2024-04-29 PROCEDURE — 77067 SCR MAMMO BI INCL CAD: CPT

## 2024-07-19 ENCOUNTER — OFFICE VISIT (OUTPATIENT)
Dept: UROLOGY | Facility: CLINIC | Age: 79
End: 2024-07-19
Payer: COMMERCIAL

## 2024-07-19 VITALS
HEART RATE: 76 BPM | DIASTOLIC BLOOD PRESSURE: 68 MMHG | OXYGEN SATURATION: 96 % | HEIGHT: 64 IN | WEIGHT: 156 LBS | BODY MASS INDEX: 26.63 KG/M2 | SYSTOLIC BLOOD PRESSURE: 128 MMHG

## 2024-07-19 DIAGNOSIS — N39.0 RECURRENT UTI: Primary | ICD-10-CM

## 2024-07-19 LAB — POST-VOID RESIDUAL VOLUME, ML POC: 239 ML

## 2024-07-19 PROCEDURE — 99213 OFFICE O/P EST LOW 20 MIN: CPT

## 2024-07-19 PROCEDURE — 51798 US URINE CAPACITY MEASURE: CPT

## 2024-07-19 RX ORDER — FAMOTIDINE 20 MG/1
20 TABLET, FILM COATED ORAL DAILY
COMMUNITY

## 2024-07-19 RX ORDER — BECLOMETHASONE DIPROPIONATE HFA 40 UG/1
1 AEROSOL, METERED RESPIRATORY (INHALATION) 2 TIMES DAILY
COMMUNITY

## 2024-07-19 RX ORDER — MOMETASONE FUROATE MONOHYDRATE 50 UG/1
SPRAY, METERED NASAL
COMMUNITY
Start: 2024-03-19

## 2024-07-19 NOTE — PROGRESS NOTES
Office Visit- Urology  Gina Olivas 1945 MRN: [id number]      Assessment/Discussion/Plan    79 y.o. female managed by     Recurrent UTI  -maintained on vagifem and oral cranberry  -doing well. No recurrent urinary tract infection     2. Microscopic hematuria   -6-10 RBC seen on microscopy in June 2024  -patient reports she has intermittent blood in pads. Recent gyn exam in march 2024 without abnormality   -CT abd/pelvis with and without with bilateral renal cysts. Do not have access to images but apparently without concerning features   -dicussed possible etiologies. Offereed cysto for completion of workup. Patient agreeable     3.Cystocele  -follows with gyn  - ml  -consideration of referral to urogyn for surgery or back to gyn for pessary if incomplete bladder emptying worsens         Chief Complaint:   Gina is a 79 y.o. female presenting to the office for a follow up visit regarding  recurrent UTI        Subjective    77 year old female with hx of Shonda, cystocele, vaginal atrophy who presents for annual follow up. Doing well with no UTI over past year. Blood in pad that happens intermittently. Saw GYN in march 2024 for annual exam with no abnormalities besides known cystocele. Patient expresses occasional sensation of needing to uri ante but then not being able to. PVR today is 239 ml. Micro blood in June 2024. Ct [facility]. No hydor          ROS:   Review of Systems   Constitutional: Negative.  Negative for chills, fatigue and fever.   HENT: Negative.     Respiratory:  Negative for shortness of breath.    Cardiovascular:  Negative for chest pain.   Gastrointestinal: Negative.  Negative for abdominal pain.   Endocrine: Negative.    Musculoskeletal: Negative.    Skin: Negative.    Neurological: Negative.  Negative for dizziness and light-headedness.   Hematological: Negative.    Psychiatric/Behavioral: Negative.           Past Medical History  Past  Medical History:   Diagnosis Date    Anxiety     Back pain     Chronic cystitis     DJD (degenerative joint disease) 06/05/2019    Dysuria     Frequent urination     Heartburn     Hematuria     Hypertension     Migraine     compressed    Seasonal allergies     Sinusitis     Urinary tract infection     UTI (urinary tract infection)        Past Surgical History  Past Surgical History:   Procedure Laterality Date    APPENDECTOMY      BUNIONECTOMY      COLONOSCOPY  03/2017    Dr. Muñoz, [facility].  Internal hemorrhoids, diverticulosis in the sigmoid and descending colon, otherwise normal.  5 year recall.    COLONOSCOPY  2011    Dr Erwin, at [facility].  Left diverticulosis, internal hemorrhoid ulcerated as source of rectal bleeding, otherwise normal exam.    COLONOSCOPY  2008    Dr Erwin, at [facility].  Sigmoid tubular adenoma polyp removed.    HYSTERECTOMY      at age 66    KNEE SURGERY      OOPHORECTOMY      age 66    ROTATOR CUFF REPAIR         Past Family History  Family History   Problem Relation Age of Onset    Kidney failure Mother     No Known Problems Father     No Known Problems Sister     No Known Problems Maternal Grandmother     No Known Problems Maternal Grandfather     No Known Problems Paternal Grandmother     No Known Problems Paternal Grandfather     No Known Problems Maternal Aunt     No Known Problems Maternal Aunt     No Known Problems Maternal Aunt     No Known Problems Paternal Aunt     No Known Problems Paternal Aunt     No Known Problems Paternal Aunt     No Known Problems Paternal Aunt     Diabetes Family        Past Social history  Social History     Socioeconomic History    Marital status: /Civil Union     Spouse name: Not on file    Number of children: Not on file    Years of education: Not on file    Highest education level: Not on file   Occupational History    Occupation: Rtired     Comment:    Tobacco Use    Smoking status: Never    Smokeless tobacco: Never   Vaping Use    Vaping  status: Never Used   Substance and Sexual Activity    Alcohol use: Not Currently     Alcohol/week: 10.0 standard drinks of alcohol     Types: 10 Glasses of wine per week     Comment: rare    Drug use: Never    Sexual activity: Not Currently   Other Topics Concern    Not on file   Social History Narrative    Not on file     Social Determinants of Health     Financial Resource Strain: Low Risk  (6/22/2024)    Received from [facility] [place]    Overall Financial Resource Strain (CARDIA)     Difficulty of Paying Living Expenses: Not hard at all   Food Insecurity: No Food Insecurity (6/22/2024)    Received from [facility] [place]    Hunger Vital Sign     Worried About Running Out of Food in the Last Year: Never true     Ran Out of Food in the Last Year: Never true   Transportation Needs: No Transportation Needs (6/22/2024)    Received from [facility] [place]    PRAPARE - Transportation     Lack of Transportation (Medical): No     Lack of Transportation (Non-Medical): No   Physical Activity: Not on file   Stress: Stress Concern Present (6/26/2023)    Received from [facility] [place], [facility] [place]    [employer] - Occupational Stress Questionnaire     Feeling of Stress : To some extent   Social Connections: Socially Integrated (6/26/2023)    Received from [facility] [place], [facility] [place]    Social Connection and Isolation Panel [NHANES]     Frequency of Communication with Friends and Family: More than three times a week     Frequency of Social Gatherings with Friends and Family: Once a week     Attends Congregational Services: More than 4 times per year     Active Member of Clubs or Organizations: Not on file     Attends Club or Organization Meetings: 1 to 4 times per year     Marital Status:    Intimate Partner Violence: Not At Risk (6/22/2024)    Received from [facility] [place]    Humiliation,  Afraid, Rape, and Kick questionnaire     Fear of Current or Ex-Partner: No     Emotionally Abused: No     Physically Abused: No     Sexually Abused: No   Housing Stability: Low Risk  (6/22/2024)    Received from [facility]    Housing Stability Vital Sign     Unable to Pay for Housing in the Last Year: No     Number of Times Moved in the Last Year: 1     Homeless in the Last Year: No       Current Medications  Current Outpatient Medications   Medication Sig Dispense Refill    amLODIPine (NORVASC) 2.5 mg tablet       aspirin (ECOTRIN LOW STRENGTH) 81 mg EC tablet Take 81 mg by mouth daily      beclomethasone (Qvar RediHaler) 40 MCG/ACT inhaler Inhale 1 puff 2 (two) times a day      BLACK ELDERBERRY,BERRY-FLOWER, PO Take by mouth      calcium-vitamin D (OSCAL 500 + D) 500 mg-200 units per tablet Take by mouth      CRANBERRY FRUIT PO Take by mouth      desloratadine (CLARINEX) 5 MG tablet   0    escitalopram (LEXAPRO) 10 mg tablet   0    estradiol (VAGIFEM, YUVAFEM) 10 MCG TABS vaginal tablet insert 1 tablet vaginally two times a week 10 tablet 6    famotidine (PEPCID) 20 mg tablet Take 20 mg by mouth daily      meclizine (ANTIVERT) 12.5 MG tablet Take 12.5 mg by mouth 3 (three) times a day as needed      Multiple Vitamin (MULTIVITAMIN) tablet Take 1 tablet by mouth daily      simvastatin (ZOCOR) 20 mg tablet Take 20 mg by mouth every evening  0    mometasone (NASONEX) 50 mcg/act nasal spray Compound Ingredients: [employer]Med sinus rinse bottle: Add contents of capsule to 240 ml saline.  Irrigate each nostril with 120 ml of medicated saline qhs  Mometasone 0.6 mg qhs for 30 days (Patient not taking: Reported on 7/19/2024)      Omega-3 Fatty Acids (Fish Oil) 300 MG CAPS  (Patient not taking: Reported on 7/19/2024)       No current facility-administered medications for this visit.       Allergies  Allergies   Allergen Reactions    Benzocaine Other (See Comments)    Ciprofloxacin Other (See Comments)     "Codeine Other (See Comments)    Ibuprofen Other (See Comments)     takes ASA at home  Reaction Date: 17Jun2011;     Mangifera Indica      Other reaction(s): numbness of lips  (NITO)    Nitrofurantoin Other (See Comments)     Other reaction(s): Flu like sysptoms  Reaction Date: 17Jun2011;     Latex Other (See Comments) and Rash     Reaction Date: 17Jun2011;     Sulfa Antibiotics Other (See Comments)    Sulfacetamide Rash    Sulfasalazine Other (See Comments)       OBJECTIVE    Vitals   Vitals:    07/19/24 1548   BP: 128/68   BP Location: Left arm   Patient Position: Sitting   Cuff Size: Standard   Pulse: 76   SpO2: 96%   Weight: 70.8 kg (156 lb)   Height: 5' 4\" (1.626 m)       PVR:    Physical Exam  Constitutional:       General: She is not in acute distress.     Appearance: Normal appearance. She is normal weight. She is not ill-appearing or toxic-appearing.   HENT:      Head: Normocephalic and atraumatic.   Eyes:      Conjunctiva/sclera: Conjunctivae normal.   Cardiovascular:      Rate and Rhythm: Normal rate.   Pulmonary:      Effort: Pulmonary effort is normal. No respiratory distress.   Skin:     General: Skin is warm and dry.   Neurological:      General: No focal deficit present.      Mental Status: She is alert and oriented to person, place, and time.      Cranial Nerves: No cranial nerve deficit.   Psychiatric:         Mood and Affect: Mood normal.         Behavior: Behavior normal.         Thought Content: Thought content normal.          Labs:     Lab Results   Component Value Date    CREATININE 0.65 06/24/2024      Lab Results   Component Value Date    HGBA1C 5.4 06/28/2023     Lab Results   Component Value Date    CALCIUM 8.9 06/24/2024    K 4.3 06/24/2024    CO2 28 06/24/2024     06/24/2024    BUN 14 06/24/2024    CREATININE 0.65 06/24/2024       I have personally reviewed all pertinent lab results and reviewed with patient    Imaging   CTA abdomen pelvis w wo contrast  Order: " [id number]  Impression    Impression:  1. Small gas/fluid collection located just superior to the transverse portion of  duodenum. While this probably represents a large duodenal diverticulum, possibly  of contained perforation cannot be entirely excluded. Recommend clinical  correlation as well as an upper GI fluoroscopic examination with water-soluble  contrast for further evaluation  2. Mild diffuse periportal edema in the liver, likely secondary to intravenous  hydration            Workstation:[id number]  Narrative    History: Abdominal pain.     Exam: CT of the abdomen and pelvis with IV contrast.     Technique: Using helical technique, axial images were obtained through the  abdomen and pelvis following administration of 85 cc of Omnipaque 350  intravenous contrast. Coronal and sagittal reformations were performed.     Comparison: 9/10/2012         Findings:    Lung Bases: There is a calcified granuloma in the periphery of the right lower  lobe on image 5 measuring 6 mm. Lung bases otherwise clear    Abdomen:    Liver: There is a stable small, probable cyst in the right hepatic lobe  anteriorly on image 30 measuring 8 mm. A new small central cyst is also present  on image 40 measuring about 1.3 cm. There is diffuse periportal edema probably  due to intravenous hydration or cardiac disease    Gallbladder/Bile ducts: Unremarkable.    Pancreas: No discrete pancreatic mass or pancreatic parenchymal atrophy is  visualized. There is mild diffuse dilatation of the pancreatic duct similar to  2012 examination, probably incidental, related to age.    Spleen: Spleen is normal in size. A stable calcified cyst granulomas in the  spleen    Adrenal glands: Normal in appearance.    Kidneys/ureters: Enhance symmetrically without hydronephrosis. No evidence of  nephrolithiasis. Bilateral small renal cysts are present including a larger one  of the lower pole right kidney measuring up to 4 cm.    Peritoneum: No  ascites.    Bowel/Mesentery: There is no bowel obstruction. There is a small gas and fluid  collection in the proximal small bowel mesentery just superior to the third  portion of duodenum on image 69 measuring about 4.1 x 1.2 cm. There was a small  duodenal diverticulum in this region on the 2012 CT scan, and it is quite  possible that this may have enlarged, , although clinical correlation and  further studies may be needed to exclude a small contained perforation.    Appendix: Not clearly visualized.    Vessels: Normal caliber abdominal aorta.    Lymph nodes: No adenopathy in the abdomen.    Abdominal Wall: Unremarkable.     Pelvis:  Patient is status post hysterectomy. There is no adnexal mass    Urinary Bladder: No asymmetric bladder wall thickening present.    Lymph nodes:  No adenopathy in the pelvis.    Bones: Lumbar scoliosis is visualized with scattered endplate degenerative  changes in the mid and lower lumbar spine       Ata Ewing PA-C  Date: 7/19/2024 Time: 3:52 PM  [facility]    This note was written using fluency dictation software. Please excuse any resulting minor grammatical errors.

## 2024-11-10 DIAGNOSIS — N39.0 RECURRENT UTI: ICD-10-CM

## 2024-11-12 RX ORDER — ESTRADIOL 10 UG/1
1 TABLET VAGINAL 2 TIMES WEEKLY
Qty: 8 TABLET | Refills: 5 | Status: SHIPPED | OUTPATIENT
Start: 2024-11-14

## 2024-11-21 ENCOUNTER — PROCEDURE VISIT (OUTPATIENT)
Dept: UROLOGY | Facility: CLINIC | Age: 79
End: 2024-11-21
Payer: COMMERCIAL

## 2024-11-21 VITALS
WEIGHT: 155 LBS | BODY MASS INDEX: 26.46 KG/M2 | OXYGEN SATURATION: 95 % | HEIGHT: 64 IN | DIASTOLIC BLOOD PRESSURE: 84 MMHG | HEART RATE: 88 BPM | SYSTOLIC BLOOD PRESSURE: 136 MMHG

## 2024-11-21 DIAGNOSIS — R31.29 MICROSCOPIC HEMATURIA: Primary | ICD-10-CM

## 2024-11-21 DIAGNOSIS — N39.0 RECURRENT UTI: ICD-10-CM

## 2024-11-21 LAB — POST-VOID RESIDUAL VOLUME, ML POC: 192 ML

## 2024-11-21 PROCEDURE — 51798 US URINE CAPACITY MEASURE: CPT | Performed by: UROLOGY

## 2024-11-21 PROCEDURE — 52000 CYSTOURETHROSCOPY: CPT | Performed by: UROLOGY

## 2024-11-21 RX ORDER — ERYTHROMYCIN 5 MG/G
OINTMENT OPHTHALMIC
COMMUNITY
Start: 2024-11-04

## 2024-11-21 NOTE — PROGRESS NOTES
Patient with history of recurrent UTI and microhematuria.  She has been well-managed on vaginal estrogen therapy without recurrences.  She was found once again to have microhematuria in 2024.  She has not had cystoscopic evaluation.  She is followed by gynecology and told of a pelvic organ prolapse.  She has maintained postvoid residual greater than 100 mL.  She feels that she empties her bladder well and has no symptoms at this time.       Cystoscopy     Date/Time  11/21/2024 1:30 PM     Performed by  Donavan Shanks MD   Authorized by  Donavan Shanks MD         Procedure Details:  Procedure type: cystoscopy    Additional Procedure Details: Patient was prepped with chlorhexidine and lidocaine jelly.  Flexible cystoscope was placed.  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  There is a mild grade 1 prolapse.  Bladder mucosa is normal.  There is no suspicious mass or lesion.  Ureteral orifices normal.    Plan  She was reassured.  Postvoid residual is acceptable.  We discussed timed and double voiding.  Does not appear to require reduction of cystocele.  Hematuria likely due to urethral caruncle.  No treatment necessary.

## 2025-04-25 ENCOUNTER — TELEPHONE (OUTPATIENT)
Age: 80
End: 2025-04-25

## 2025-04-25 DIAGNOSIS — Z12.31 SCREENING MAMMOGRAM FOR BREAST CANCER: Primary | ICD-10-CM

## 2025-04-25 DIAGNOSIS — Z13.820 SCREENING FOR OSTEOPOROSIS: ICD-10-CM

## 2025-04-25 DIAGNOSIS — Z78.0 ASYMPTOMATIC MENOPAUSAL STATE: ICD-10-CM

## 2025-04-25 NOTE — TELEPHONE ENCOUNTER
Patient called, requested screening mammogram & DXA scripts. Medicare bi-annual scheduled 3/19/26.   Pt has central scheduling ph# to call next week.

## 2025-04-25 NOTE — TELEPHONE ENCOUNTER
Orders for mammogram and Dexa scan entered. Left message making patient aware. Offered call back for any further questions or concerns at [phone].

## 2025-05-19 DIAGNOSIS — N39.0 RECURRENT UTI: ICD-10-CM

## 2025-05-19 NOTE — TELEPHONE ENCOUNTER
Reason for call:   [x] Refill   [] Prior Auth  [] Other:     Office:   [] PCP/Provider -   [x] Specialty/Provider - [facility]     Medication: Yuvafem 10 MCG TABS vaginal tablet     Dose/Frequency: 1 tablet, 2 times weekly     Quantity: 24    Pharmacy: [id number]    Local Pharmacy   Does the patient have enough for 3 days?   [x] Yes   [] No - Send as HP to POD    Mail Away Pharmacy   Does the patient have enough for 10 days?   [] Yes   [] No - Send as HP to POD

## 2025-05-20 RX ORDER — ESTRADIOL 10 UG/1
1 TABLET, FILM COATED VAGINAL 2 TIMES WEEKLY
Qty: 28 TABLET | Refills: 3 | Status: SHIPPED | OUTPATIENT
Start: 2025-05-22

## 2025-06-18 ENCOUNTER — HOSPITAL ENCOUNTER (OUTPATIENT)
Dept: MAMMOGRAPHY | Facility: MEDICAL CENTER | Age: 80
Discharge: HOME | End: 2025-06-18
Payer: MEDICARE

## 2025-06-18 VITALS — BODY MASS INDEX: 26.46 KG/M2 | HEIGHT: 64 IN | WEIGHT: 155 LBS

## 2025-06-18 DIAGNOSIS — Z12.31 SCREENING MAMMOGRAM FOR BREAST CANCER: ICD-10-CM

## 2025-06-18 PROCEDURE — 77063 BREAST TOMOSYNTHESIS BI: CPT

## 2025-06-18 PROCEDURE — 77067 SCR MAMMO BI INCL CAD: CPT

## 2025-06-23 ENCOUNTER — RESULTS FOLLOW-UP (OUTPATIENT)
Dept: OBGYN CLINIC | Facility: CLINIC | Age: 80
End: 2025-06-23

## 2025-06-30 ENCOUNTER — HOSPITAL ENCOUNTER (OUTPATIENT)
Dept: BONE DENSITY | Facility: MEDICAL CENTER | Age: 80
Discharge: HOME | End: 2025-06-30
Payer: MEDICARE

## 2025-06-30 DIAGNOSIS — Z78.0 ASYMPTOMATIC MENOPAUSAL STATE: ICD-10-CM

## 2025-06-30 DIAGNOSIS — Z13.820 SCREENING FOR OSTEOPOROSIS: ICD-10-CM

## 2025-06-30 PROCEDURE — 77080 DXA BONE DENSITY AXIAL: CPT
